# Patient Record
Sex: MALE | Race: WHITE | NOT HISPANIC OR LATINO
[De-identification: names, ages, dates, MRNs, and addresses within clinical notes are randomized per-mention and may not be internally consistent; named-entity substitution may affect disease eponyms.]

---

## 2017-10-18 ENCOUNTER — APPOINTMENT (OUTPATIENT)
Dept: ORTHOPEDIC SURGERY | Facility: CLINIC | Age: 82
End: 2017-10-18
Payer: MEDICARE

## 2017-10-18 VITALS
SYSTOLIC BLOOD PRESSURE: 116 MMHG | HEIGHT: 68 IN | DIASTOLIC BLOOD PRESSURE: 69 MMHG | WEIGHT: 208 LBS | BODY MASS INDEX: 31.52 KG/M2 | HEART RATE: 76 BPM

## 2017-10-18 PROCEDURE — 73562 X-RAY EXAM OF KNEE 3: CPT | Mod: 50

## 2017-10-18 PROCEDURE — 20610 DRAIN/INJ JOINT/BURSA W/O US: CPT | Mod: 50

## 2017-10-18 PROCEDURE — 99204 OFFICE O/P NEW MOD 45 MIN: CPT | Mod: 25

## 2017-10-18 RX ORDER — CARBIDOPA AND LEVODOPA 25; 250 MG/1; MG/1
TABLET ORAL
Refills: 0 | Status: ACTIVE | COMMUNITY

## 2017-10-18 RX ORDER — DULOXETINE HYDROCHLORIDE 30 MG/1
30 CAPSULE, DELAYED RELEASE PELLETS ORAL
Refills: 0 | Status: ACTIVE | COMMUNITY

## 2017-10-18 RX ORDER — ROPINIROLE 5 MG/1
TABLET, FILM COATED ORAL
Refills: 0 | Status: ACTIVE | COMMUNITY

## 2017-10-18 RX ORDER — METOPROLOL TARTRATE 75 MG/1
TABLET, FILM COATED ORAL
Refills: 0 | Status: ACTIVE | COMMUNITY

## 2017-12-18 ENCOUNTER — APPOINTMENT (OUTPATIENT)
Dept: ORTHOPEDIC SURGERY | Facility: CLINIC | Age: 82
End: 2017-12-18
Payer: MEDICARE

## 2017-12-18 VITALS
BODY MASS INDEX: 31.52 KG/M2 | SYSTOLIC BLOOD PRESSURE: 132 MMHG | WEIGHT: 208 LBS | DIASTOLIC BLOOD PRESSURE: 80 MMHG | HEIGHT: 68 IN | HEART RATE: 63 BPM

## 2017-12-18 DIAGNOSIS — M17.11 UNILATERAL PRIMARY OSTEOARTHRITIS, RIGHT KNEE: ICD-10-CM

## 2017-12-18 DIAGNOSIS — G89.29 LUMBAGO WITH SCIATICA, RIGHT SIDE: ICD-10-CM

## 2017-12-18 DIAGNOSIS — M54.41 LUMBAGO WITH SCIATICA, RIGHT SIDE: ICD-10-CM

## 2017-12-18 DIAGNOSIS — M17.12 UNILATERAL PRIMARY OSTEOARTHRITIS, LEFT KNEE: ICD-10-CM

## 2017-12-18 PROCEDURE — 99213 OFFICE O/P EST LOW 20 MIN: CPT

## 2017-12-28 ENCOUNTER — APPOINTMENT (OUTPATIENT)
Dept: PHYSICAL MEDICINE AND REHAB | Facility: CLINIC | Age: 82
End: 2017-12-28
Payer: MEDICARE

## 2017-12-28 VITALS
SYSTOLIC BLOOD PRESSURE: 145 MMHG | HEIGHT: 68 IN | BODY MASS INDEX: 31.37 KG/M2 | WEIGHT: 207 LBS | HEART RATE: 66 BPM | DIASTOLIC BLOOD PRESSURE: 85 MMHG

## 2017-12-28 DIAGNOSIS — M19.90 UNSPECIFIED OSTEOARTHRITIS, UNSPECIFIED SITE: ICD-10-CM

## 2017-12-28 PROCEDURE — 99205 OFFICE O/P NEW HI 60 MIN: CPT

## 2017-12-28 RX ORDER — DULOXETINE HYDROCHLORIDE 30 MG/1
30 CAPSULE, DELAYED RELEASE PELLETS ORAL
Qty: 15 | Refills: 0 | Status: ACTIVE | COMMUNITY
Start: 2017-12-28 | End: 1900-01-01

## 2017-12-28 RX ORDER — PREDNISONE 50 MG/1
TABLET ORAL
Refills: 0 | Status: DISCONTINUED | COMMUNITY
End: 2017-12-28

## 2017-12-28 RX ORDER — ACETAMINOPHEN AND CODEINE 300; 30 MG/1; MG/1
300-30 TABLET ORAL
Qty: 60 | Refills: 0 | Status: ACTIVE | COMMUNITY
Start: 2017-12-28 | End: 1900-01-01

## 2018-01-02 ENCOUNTER — RX RENEWAL (OUTPATIENT)
Age: 83
End: 2018-01-02

## 2018-01-08 ENCOUNTER — RX RENEWAL (OUTPATIENT)
Age: 83
End: 2018-01-08

## 2018-01-08 DIAGNOSIS — R06.9 UNSPECIFIED ABNORMALITIES OF BREATHING: ICD-10-CM

## 2018-01-12 ENCOUNTER — APPOINTMENT (OUTPATIENT)
Dept: PHYSICAL MEDICINE AND REHAB | Facility: CLINIC | Age: 83
End: 2018-01-12
Payer: MEDICARE

## 2018-01-12 ENCOUNTER — OUTPATIENT (OUTPATIENT)
Dept: OUTPATIENT SERVICES | Facility: HOSPITAL | Age: 83
LOS: 1 days | End: 2018-01-12
Payer: COMMERCIAL

## 2018-01-12 DIAGNOSIS — M48.062 SPINAL STENOSIS, LUMBAR REGION WITH NEUROGENIC CLAUDICATION: ICD-10-CM

## 2018-01-12 PROCEDURE — 62323 NJX INTERLAMINAR LMBR/SAC: CPT

## 2018-01-12 PROCEDURE — 77003 FLUOROGUIDE FOR SPINE INJECT: CPT

## 2018-01-16 ENCOUNTER — TRANSCRIPTION ENCOUNTER (OUTPATIENT)
Age: 83
End: 2018-01-16

## 2018-01-17 ENCOUNTER — RX RENEWAL (OUTPATIENT)
Age: 83
End: 2018-01-17

## 2018-01-23 ENCOUNTER — APPOINTMENT (OUTPATIENT)
Dept: ORTHOPEDIC SURGERY | Facility: HOSPITAL | Age: 83
End: 2018-01-23

## 2018-02-02 ENCOUNTER — APPOINTMENT (OUTPATIENT)
Dept: PHYSICAL MEDICINE AND REHAB | Facility: CLINIC | Age: 83
End: 2018-02-02
Payer: MEDICARE

## 2018-02-02 DIAGNOSIS — M76.31 ILIOTIBIAL BAND SYNDROME, RIGHT LEG: ICD-10-CM

## 2018-02-02 PROCEDURE — 99214 OFFICE O/P EST MOD 30 MIN: CPT

## 2018-02-07 VITALS — HEIGHT: 68 IN | HEART RATE: 66 BPM | WEIGHT: 207 LBS | RESPIRATION RATE: 14 BRPM | BODY MASS INDEX: 31.37 KG/M2

## 2018-02-09 ENCOUNTER — OUTPATIENT (OUTPATIENT)
Dept: OUTPATIENT SERVICES | Facility: HOSPITAL | Age: 83
LOS: 1 days | End: 2018-02-09
Payer: MEDICARE

## 2018-02-09 DIAGNOSIS — M25.551 PAIN IN RIGHT HIP: ICD-10-CM

## 2018-02-09 DIAGNOSIS — Z51.89 ENCOUNTER FOR OTHER SPECIFIED AFTERCARE: ICD-10-CM

## 2018-02-14 ENCOUNTER — APPOINTMENT (OUTPATIENT)
Dept: ORTHOPEDIC SURGERY | Facility: CLINIC | Age: 83
End: 2018-02-14

## 2018-02-16 PROCEDURE — G8978: CPT | Mod: CL

## 2018-02-16 PROCEDURE — 97163 PT EVAL HIGH COMPLEX 45 MIN: CPT

## 2018-02-16 PROCEDURE — G8979: CPT | Mod: CK

## 2018-02-16 PROCEDURE — 97010 HOT OR COLD PACKS THERAPY: CPT

## 2018-02-16 PROCEDURE — 97110 THERAPEUTIC EXERCISES: CPT

## 2018-03-20 ENCOUNTER — APPOINTMENT (OUTPATIENT)
Dept: ORTHOPEDIC SURGERY | Facility: CLINIC | Age: 83
End: 2018-03-20

## 2018-04-25 ENCOUNTER — APPOINTMENT (OUTPATIENT)
Dept: ORTHOPEDIC SURGERY | Facility: CLINIC | Age: 83
End: 2018-04-25

## 2018-05-07 ENCOUNTER — APPOINTMENT (OUTPATIENT)
Dept: ORTHOPEDIC SURGERY | Facility: CLINIC | Age: 83
End: 2018-05-07

## 2018-05-11 ENCOUNTER — APPOINTMENT (OUTPATIENT)
Dept: PHYSICAL MEDICINE AND REHAB | Facility: CLINIC | Age: 83
End: 2018-05-11
Payer: MEDICARE

## 2018-05-11 VITALS
HEIGHT: 68 IN | DIASTOLIC BLOOD PRESSURE: 81 MMHG | SYSTOLIC BLOOD PRESSURE: 135 MMHG | BODY MASS INDEX: 31.37 KG/M2 | WEIGHT: 207 LBS

## 2018-05-11 DIAGNOSIS — M48.062 SPINAL STENOSIS, LUMBAR REGION WITH NEUROGENIC CLAUDICATION: ICD-10-CM

## 2018-05-11 PROCEDURE — 99214 OFFICE O/P EST MOD 30 MIN: CPT

## 2018-05-14 PROBLEM — M48.062 LUMBAR STENOSIS WITH NEUROGENIC CLAUDICATION: Status: ACTIVE | Noted: 2018-01-01

## 2018-06-07 ENCOUNTER — TRANSCRIPTION ENCOUNTER (OUTPATIENT)
Age: 83
End: 2018-06-07

## 2018-06-08 ENCOUNTER — OUTPATIENT (OUTPATIENT)
Dept: OUTPATIENT SERVICES | Facility: HOSPITAL | Age: 83
LOS: 1 days | End: 2018-06-08
Payer: MEDICARE

## 2018-06-08 ENCOUNTER — APPOINTMENT (OUTPATIENT)
Dept: PHYSICAL MEDICINE AND REHAB | Facility: CLINIC | Age: 83
End: 2018-06-08
Payer: MEDICARE

## 2018-06-08 DIAGNOSIS — M54.16 RADICULOPATHY, LUMBAR REGION: ICD-10-CM

## 2018-06-08 PROCEDURE — 77003 FLUOROGUIDE FOR SPINE INJECT: CPT

## 2018-06-08 PROCEDURE — 62323 NJX INTERLAMINAR LMBR/SAC: CPT

## 2018-07-19 ENCOUNTER — APPOINTMENT (OUTPATIENT)
Dept: PHYSICAL MEDICINE AND REHAB | Facility: CLINIC | Age: 83
End: 2018-07-19

## 2018-09-14 ENCOUNTER — OTHER (OUTPATIENT)
Age: 83
End: 2018-09-14

## 2018-11-12 ENCOUNTER — OUTPATIENT (OUTPATIENT)
Dept: OUTPATIENT SERVICES | Facility: HOSPITAL | Age: 83
LOS: 1 days | End: 2018-11-12
Payer: MEDICARE

## 2018-11-12 DIAGNOSIS — Z51.89 ENCOUNTER FOR OTHER SPECIFIED AFTERCARE: ICD-10-CM

## 2018-11-20 DIAGNOSIS — R29.6 REPEATED FALLS: ICD-10-CM

## 2018-11-20 DIAGNOSIS — R27.9 UNSPECIFIED LACK OF COORDINATION: ICD-10-CM

## 2018-12-19 PROCEDURE — 97110 THERAPEUTIC EXERCISES: CPT

## 2018-12-19 PROCEDURE — 97112 NEUROMUSCULAR REEDUCATION: CPT

## 2018-12-19 PROCEDURE — 97163 PT EVAL HIGH COMPLEX 45 MIN: CPT

## 2019-06-14 ENCOUNTER — EMERGENCY (EMERGENCY)
Facility: HOSPITAL | Age: 84
LOS: 1 days | Discharge: HOME | End: 2019-06-14
Attending: EMERGENCY MEDICINE
Payer: MEDICARE

## 2019-06-14 VITALS
RESPIRATION RATE: 20 BRPM | OXYGEN SATURATION: 99 % | SYSTOLIC BLOOD PRESSURE: 158 MMHG | HEART RATE: 81 BPM | TEMPERATURE: 98 F | WEIGHT: 190.04 LBS | HEIGHT: 68 IN | DIASTOLIC BLOOD PRESSURE: 86 MMHG

## 2019-06-14 LAB
ALBUMIN SERPL ELPH-MCNC: 4.3 G/DL — SIGNIFICANT CHANGE UP (ref 3.3–5.2)
ALP SERPL-CCNC: 73 U/L — SIGNIFICANT CHANGE UP (ref 40–120)
ALT FLD-CCNC: 14 U/L — SIGNIFICANT CHANGE UP
ANION GAP SERPL CALC-SCNC: 13 MMOL/L — SIGNIFICANT CHANGE UP (ref 5–17)
APTT BLD: 26.7 SEC — LOW (ref 27.5–36.3)
AST SERPL-CCNC: 22 U/L — SIGNIFICANT CHANGE UP
BASOPHILS # BLD AUTO: 0 K/UL — SIGNIFICANT CHANGE UP (ref 0–0.2)
BASOPHILS NFR BLD AUTO: 0.1 % — SIGNIFICANT CHANGE UP (ref 0–2)
BILIRUB SERPL-MCNC: 0.7 MG/DL — SIGNIFICANT CHANGE UP (ref 0.4–2)
BUN SERPL-MCNC: 30 MG/DL — HIGH (ref 8–20)
CALCIUM SERPL-MCNC: 9.3 MG/DL — SIGNIFICANT CHANGE UP (ref 8.6–10.2)
CHLORIDE SERPL-SCNC: 98 MMOL/L — SIGNIFICANT CHANGE UP (ref 98–107)
CO2 SERPL-SCNC: 29 MMOL/L — SIGNIFICANT CHANGE UP (ref 22–29)
CREAT SERPL-MCNC: 1.2 MG/DL — SIGNIFICANT CHANGE UP (ref 0.5–1.3)
EOSINOPHIL # BLD AUTO: 0 K/UL — SIGNIFICANT CHANGE UP (ref 0–0.5)
EOSINOPHIL NFR BLD AUTO: 0.3 % — SIGNIFICANT CHANGE UP (ref 0–6)
GLUCOSE SERPL-MCNC: 111 MG/DL — SIGNIFICANT CHANGE UP (ref 70–115)
HCT VFR BLD CALC: 41.8 % — LOW (ref 42–52)
HGB BLD-MCNC: 13.6 G/DL — LOW (ref 14–18)
INR BLD: 0.93 RATIO — SIGNIFICANT CHANGE UP (ref 0.88–1.16)
LACTATE BLDV-MCNC: 3 MMOL/L — HIGH (ref 0.5–2)
LYMPHOCYTES # BLD AUTO: 15.5 % — LOW (ref 20–55)
LYMPHOCYTES # BLD AUTO: 2 K/UL — SIGNIFICANT CHANGE UP (ref 1–4.8)
MCHC RBC-ENTMCNC: 30.4 PG — SIGNIFICANT CHANGE UP (ref 27–31)
MCHC RBC-ENTMCNC: 32.5 G/DL — SIGNIFICANT CHANGE UP (ref 32–36)
MCV RBC AUTO: 93.5 FL — SIGNIFICANT CHANGE UP (ref 80–94)
MONOCYTES # BLD AUTO: 0.8 K/UL — SIGNIFICANT CHANGE UP (ref 0–0.8)
MONOCYTES NFR BLD AUTO: 6.2 % — SIGNIFICANT CHANGE UP (ref 3–10)
NEUTROPHILS # BLD AUTO: 10.2 K/UL — HIGH (ref 1.8–8)
NEUTROPHILS NFR BLD AUTO: 77.2 % — HIGH (ref 37–73)
PLATELET # BLD AUTO: 287 K/UL — SIGNIFICANT CHANGE UP (ref 150–400)
POTASSIUM SERPL-MCNC: 4.4 MMOL/L — SIGNIFICANT CHANGE UP (ref 3.5–5.3)
POTASSIUM SERPL-SCNC: 4.4 MMOL/L — SIGNIFICANT CHANGE UP (ref 3.5–5.3)
PROT SERPL-MCNC: 6.8 G/DL — SIGNIFICANT CHANGE UP (ref 6.6–8.7)
PROTHROM AB SERPL-ACNC: 10.7 SEC — SIGNIFICANT CHANGE UP (ref 10–12.9)
RBC # BLD: 4.47 M/UL — LOW (ref 4.6–6.2)
RBC # FLD: 14.4 % — SIGNIFICANT CHANGE UP (ref 11–15.6)
SODIUM SERPL-SCNC: 140 MMOL/L — SIGNIFICANT CHANGE UP (ref 135–145)
WBC # BLD: 13.2 K/UL — HIGH (ref 4.8–10.8)
WBC # FLD AUTO: 13.2 K/UL — HIGH (ref 4.8–10.8)

## 2019-06-14 PROCEDURE — 99284 EMERGENCY DEPT VISIT MOD MDM: CPT

## 2019-06-14 PROCEDURE — 72125 CT NECK SPINE W/O DYE: CPT

## 2019-06-14 PROCEDURE — 74177 CT ABD & PELVIS W/CONTRAST: CPT | Mod: 26

## 2019-06-14 PROCEDURE — 70450 CT HEAD/BRAIN W/O DYE: CPT | Mod: 26

## 2019-06-14 PROCEDURE — 85027 COMPLETE CBC AUTOMATED: CPT

## 2019-06-14 PROCEDURE — 72125 CT NECK SPINE W/O DYE: CPT | Mod: 26

## 2019-06-14 PROCEDURE — 73590 X-RAY EXAM OF LOWER LEG: CPT

## 2019-06-14 PROCEDURE — 73590 X-RAY EXAM OF LOWER LEG: CPT | Mod: 26,50

## 2019-06-14 PROCEDURE — 70450 CT HEAD/BRAIN W/O DYE: CPT

## 2019-06-14 PROCEDURE — 85730 THROMBOPLASTIN TIME PARTIAL: CPT

## 2019-06-14 PROCEDURE — 71260 CT THORAX DX C+: CPT

## 2019-06-14 PROCEDURE — 80053 COMPREHEN METABOLIC PANEL: CPT

## 2019-06-14 PROCEDURE — 71260 CT THORAX DX C+: CPT | Mod: 26

## 2019-06-14 PROCEDURE — 83605 ASSAY OF LACTIC ACID: CPT

## 2019-06-14 PROCEDURE — 90715 TDAP VACCINE 7 YRS/> IM: CPT

## 2019-06-14 PROCEDURE — 36415 COLL VENOUS BLD VENIPUNCTURE: CPT

## 2019-06-14 PROCEDURE — 99284 EMERGENCY DEPT VISIT MOD MDM: CPT | Mod: 25

## 2019-06-14 PROCEDURE — 74177 CT ABD & PELVIS W/CONTRAST: CPT

## 2019-06-14 PROCEDURE — 85610 PROTHROMBIN TIME: CPT

## 2019-06-14 RX ORDER — SODIUM CHLORIDE 9 MG/ML
1000 INJECTION INTRAMUSCULAR; INTRAVENOUS; SUBCUTANEOUS ONCE
Refills: 0 | Status: COMPLETED | OUTPATIENT
Start: 2019-06-14 | End: 2019-06-14

## 2019-06-14 RX ORDER — TETANUS TOXOID, REDUCED DIPHTHERIA TOXOID AND ACELLULAR PERTUSSIS VACCINE, ADSORBED 5; 2.5; 8; 8; 2.5 [IU]/.5ML; [IU]/.5ML; UG/.5ML; UG/.5ML; UG/.5ML
0.5 SUSPENSION INTRAMUSCULAR ONCE
Refills: 0 | Status: COMPLETED | OUTPATIENT
Start: 2019-06-14 | End: 2019-06-14

## 2019-06-14 RX ADMIN — SODIUM CHLORIDE 1000 MILLILITER(S): 9 INJECTION INTRAMUSCULAR; INTRAVENOUS; SUBCUTANEOUS at 19:15

## 2019-06-14 RX ADMIN — TETANUS TOXOID, REDUCED DIPHTHERIA TOXOID AND ACELLULAR PERTUSSIS VACCINE, ADSORBED 0.5 MILLILITER(S): 5; 2.5; 8; 8; 2.5 SUSPENSION INTRAMUSCULAR at 17:43

## 2019-06-14 NOTE — ED ADULT NURSE NOTE - INTEGUMENTARY WDL
Color consistent with ethnicity/race, warm, dry intact, resilient. with ecchymosis and wounds as charted above

## 2019-06-14 NOTE — ED ADULT NURSE REASSESSMENT NOTE - NS ED NURSE REASSESS COMMENT FT1
Report given and pt endorsed to Ismael Cunningham RN for follow up and continuity of care.
Pt reassessed.  Denies physical complaints.  Will continue to monitor.

## 2019-06-14 NOTE — ED PROVIDER NOTE - CARE PLAN
Principal Discharge DX:	Fall  Secondary Diagnosis:	Contusion  Secondary Diagnosis:	Ecchymosis Principal Discharge DX:	Fall  Secondary Diagnosis:	Contusion  Secondary Diagnosis:	Ecchymosis  Secondary Diagnosis:	Elevated lactic acid level

## 2019-06-14 NOTE — ED ADULT TRIAGE NOTE - CHIEF COMPLAINT QUOTE
was walking and fell hit head, 2 days ago and yesterday was picking up his dog and fell again takes asa 81 ng,, lac to left elbow, denies loc

## 2019-06-14 NOTE — ED ADULT NURSE NOTE - OBJECTIVE STATEMENT
84 year old male in no acute distress, alert and oriented x 4 c/o skin tear to left elbow and abrasion to right occipital, ecchymosis to right ribs and right hand s/p mechanical fall two days ago and again yesterday. Pt denies LOC, denies syncope, reports his knees are bad. Pt was told he needs bilateral knee replacements and states "but then my back went out". 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 Pt with hx of neuropathy, Parkinsons disease, has cane but daughter reports he does not use it. Fall precautions in place, wife at bedside. Pt reports "the nurse cleaned my elbow and put the dressing on it" and pt noted to have cdi to same. Will monitor.

## 2019-06-14 NOTE — ED STATDOCS - NS_ ATTENDINGSCRIBEDETAILS _ED_A_ED_FT
PT FELL NOT ON BLOOD THINNERS. ECCHYMOSIS ALL OVER JODY RUQ AND ABD WALL .  TO MAIN FOR EVAL OF MULTIPLE INJURIES AS/P FALL    The history, relevant review of systems, past medical and surgical history, medical decision making, and physical examination was documented by the scribe in my presence and I attest to the accuracy of the documentation.

## 2019-06-14 NOTE — ED PROVIDER NOTE - CLINICAL SUMMARY MEDICAL DECISION MAKING FREE TEXT BOX
85 yo M hx of parkinson and difficultuy ambulating p/w multiple fall. Will need CT head, ct cevical, ct chest/abdomen/pelvis. xray of lower extremities to r/o fracture and internal bleeding.

## 2019-06-14 NOTE — ED PROVIDER NOTE - PROGRESS NOTE DETAILS
Patient signed out to  pending repeat trop and CT scans. CT results as noted.  Repeat lactate as noted.  Pt denies any c/o and appears well and in NAD at this time.  Pt stable for d/c with family.  Instructed to use his walker and follow up PMD  Monday

## 2019-06-14 NOTE — ED PROVIDER NOTE - NS ED ROS FT
Review of Systems  •	CONSTITUTIONAL - no  fever, no diaphoresis, no weight change  •	SKIN - (+)  skin tare  •	HEMATOLOGIC - , (+)  bruising  •	EYES - no eye pain, no blurred vision  •	ENT - no change in hearing, no pain  •	RESPIRATORY - no shortness of breath, no cough  •	CARDIAC - no chest pain, no palpitations  •	GI - no abd pain, no nausea, no vomiting, no diarrhea, no constipation, no bleeding  •	GENITO-URINARY - no discharge, no dysuria; no hematuria,   •	ENDO - no polydypsia, no polyurea, no heat/no cold intolerance  •	MUSCULOSKELETAL - no joint pain, no swelling, no redness  •	NEUROLOGIC - no weakness, no headache, no anesthesia, no paresthesias  •	PSYCH - no anxiety, non suicidal, non homicidal, no hallucination, no depression

## 2019-06-14 NOTE — ED PROVIDER NOTE - OBJECTIVE STATEMENT
85 yo M hx of parkinson and HTN p/w multiple fall. He normally have trouble ambulating and need a walker, however, he doesn't use it. He report falling backwards yesterday and hitting his head. he report falling 2 days prior and hitting his arms. He fell last night. He used to go to physical therapy but no longer going due to difficulty getting in the car. 83 yo M hx of parkinson and HTN p/w multiple fall. He normally have trouble ambulating and need a walker, however, he doesn't use it. He report falling backwards yesterday and hitting his head. he report falling 2 days prior and hitting his arms. He fell last night. He used to go to physical therapy but no longer going due to difficulty getting in the car. No loc. No anticoagulation use.

## 2019-06-14 NOTE — ED ADULT NURSE NOTE - MUSCULOSKELETAL WDL
Full range of motion of upper and lower extremities, no joint tenderness/swelling. Pt noted to be stiff

## 2019-06-14 NOTE — ED ADULT NURSE NOTE - NSIMPLEMENTINTERV_GEN_ALL_ED
Implemented All Fall Risk Interventions:  [place] to call system. Call bell, personal items and telephone within reach. Instruct patient to call for assistance. Room bathroom lighting operational. Non-slip footwear when patient is off stretcher. Physically safe environment: no spills, clutter or unnecessary equipment. Stretcher in lowest position, wheels locked, appropriate side rails in place. Provide visual cue, wrist band, yellow gown, etc. Monitor gait and stability. Monitor for mental status changes and reorient to person, place, and time. Review medications for side effects contributing to fall risk. Reinforce activity limits and safety measures with patient and family.

## 2019-06-14 NOTE — ED PROVIDER NOTE - PHYSICAL EXAMINATION
VITAL SIGNS: I have reviewed nursing notes and confirm.  CONSTITUTIONAL: Well-developed; well-nourished; in no acute distress.  SKIN: Skin exam is warm and dry, no acute rash.  HEAD: Normocephalic; (+) small abrasion of left posterior head   EYES: PERRL, EOM intact; conjunctiva and sclera clear.  ENT: No nasal discharge; airway clear. Throat clear.  NECK: Supple; non tender.    CARD: S1, S2 normal; no murmurs, gallops, or rubs. Regular rate and rhythm. (+) chest wall tenderness   RESP: No wheezes,  no rales or rhonchi.   ABD:  soft; non-distended; (+) diffuse tenderness (+) ecchymosis of right flank and RUQ  EXT: Normal ROM (+) multiple skin abrasion of the b/l hand (+) 3 cm x 4 cm avulsion skin tare over the left elbow (+) tenderness to b/l knee with good ROM  NEURO: Alert, oriented. Grossly unremarkable. No focal deficits. no facial droop, moves all extremities,    PSYCH: Cooperative, appropriate.

## 2019-06-15 VITALS
OXYGEN SATURATION: 97 % | RESPIRATION RATE: 19 BRPM | SYSTOLIC BLOOD PRESSURE: 148 MMHG | TEMPERATURE: 98 F | DIASTOLIC BLOOD PRESSURE: 84 MMHG | HEART RATE: 76 BPM

## 2019-12-01 ENCOUNTER — INPATIENT (INPATIENT)
Facility: HOSPITAL | Age: 84
LOS: 3 days | Discharge: HOME | DRG: 57 | End: 2019-12-05
Attending: FAMILY MEDICINE | Admitting: HOSPITALIST
Payer: MEDICARE

## 2019-12-01 VITALS
RESPIRATION RATE: 18 BRPM | OXYGEN SATURATION: 98 % | TEMPERATURE: 98 F | DIASTOLIC BLOOD PRESSURE: 83 MMHG | HEIGHT: 68 IN | HEART RATE: 104 BPM | WEIGHT: 190.92 LBS | SYSTOLIC BLOOD PRESSURE: 140 MMHG

## 2019-12-01 DIAGNOSIS — R55 SYNCOPE AND COLLAPSE: ICD-10-CM

## 2019-12-01 PROBLEM — G20 PARKINSON'S DISEASE: Chronic | Status: ACTIVE | Noted: 2019-06-14

## 2019-12-01 PROBLEM — G62.9 POLYNEUROPATHY, UNSPECIFIED: Chronic | Status: ACTIVE | Noted: 2019-06-14

## 2019-12-01 LAB
ALBUMIN SERPL ELPH-MCNC: 3.9 G/DL — SIGNIFICANT CHANGE UP (ref 3.3–5.2)
ALP SERPL-CCNC: 67 U/L — SIGNIFICANT CHANGE UP (ref 40–120)
ALT FLD-CCNC: <5 U/L — SIGNIFICANT CHANGE UP
ANION GAP SERPL CALC-SCNC: 16 MMOL/L — SIGNIFICANT CHANGE UP (ref 5–17)
APTT BLD: 29.7 SEC — SIGNIFICANT CHANGE UP (ref 27.5–36.3)
AST SERPL-CCNC: 17 U/L — SIGNIFICANT CHANGE UP
BILIRUB SERPL-MCNC: 0.9 MG/DL — SIGNIFICANT CHANGE UP (ref 0.4–2)
BUN SERPL-MCNC: 27 MG/DL — HIGH (ref 8–20)
CALCIUM SERPL-MCNC: 8.8 MG/DL — SIGNIFICANT CHANGE UP (ref 8.6–10.2)
CHLORIDE SERPL-SCNC: 104 MMOL/L — SIGNIFICANT CHANGE UP (ref 98–107)
CO2 SERPL-SCNC: 23 MMOL/L — SIGNIFICANT CHANGE UP (ref 22–29)
CREAT SERPL-MCNC: 1.72 MG/DL — HIGH (ref 0.5–1.3)
GLUCOSE SERPL-MCNC: 178 MG/DL — HIGH (ref 70–115)
HCT VFR BLD CALC: 41.8 % — SIGNIFICANT CHANGE UP (ref 39–50)
HGB BLD-MCNC: 13.5 G/DL — SIGNIFICANT CHANGE UP (ref 13–17)
INR BLD: 1.01 RATIO — SIGNIFICANT CHANGE UP (ref 0.88–1.16)
MAGNESIUM SERPL-MCNC: 2.1 MG/DL — SIGNIFICANT CHANGE UP (ref 1.6–2.6)
MCHC RBC-ENTMCNC: 30.9 PG — SIGNIFICANT CHANGE UP (ref 27–34)
MCHC RBC-ENTMCNC: 32.3 GM/DL — SIGNIFICANT CHANGE UP (ref 32–36)
MCV RBC AUTO: 95.7 FL — SIGNIFICANT CHANGE UP (ref 80–100)
NT-PROBNP SERPL-SCNC: 312 PG/ML — HIGH (ref 0–300)
PLATELET # BLD AUTO: 278 K/UL — SIGNIFICANT CHANGE UP (ref 150–400)
POTASSIUM SERPL-MCNC: 4.8 MMOL/L — SIGNIFICANT CHANGE UP (ref 3.5–5.3)
POTASSIUM SERPL-SCNC: 4.8 MMOL/L — SIGNIFICANT CHANGE UP (ref 3.5–5.3)
PROT SERPL-MCNC: 6.3 G/DL — LOW (ref 6.6–8.7)
PROTHROM AB SERPL-ACNC: 11.6 SEC — SIGNIFICANT CHANGE UP (ref 10–12.9)
RBC # BLD: 4.37 M/UL — SIGNIFICANT CHANGE UP (ref 4.2–5.8)
RBC # FLD: 14 % — SIGNIFICANT CHANGE UP (ref 10.3–14.5)
SODIUM SERPL-SCNC: 143 MMOL/L — SIGNIFICANT CHANGE UP (ref 135–145)
TROPONIN T SERPL-MCNC: <0.01 NG/ML — SIGNIFICANT CHANGE UP (ref 0–0.06)
WBC # BLD: 9.97 K/UL — SIGNIFICANT CHANGE UP (ref 3.8–10.5)
WBC # FLD AUTO: 9.97 K/UL — SIGNIFICANT CHANGE UP (ref 3.8–10.5)

## 2019-12-01 PROCEDURE — 99285 EMERGENCY DEPT VISIT HI MDM: CPT

## 2019-12-01 PROCEDURE — 72148 MRI LUMBAR SPINE W/O DYE: CPT | Mod: 26

## 2019-12-01 PROCEDURE — 71275 CT ANGIOGRAPHY CHEST: CPT | Mod: 26

## 2019-12-01 PROCEDURE — 71045 X-RAY EXAM CHEST 1 VIEW: CPT | Mod: 26

## 2019-12-01 PROCEDURE — 99223 1ST HOSP IP/OBS HIGH 75: CPT

## 2019-12-01 PROCEDURE — 70450 CT HEAD/BRAIN W/O DYE: CPT | Mod: 26

## 2019-12-01 RX ORDER — DULOXETINE HYDROCHLORIDE 30 MG/1
30 CAPSULE, DELAYED RELEASE ORAL DAILY
Refills: 0 | Status: DISCONTINUED | OUTPATIENT
Start: 2019-12-01 | End: 2019-12-05

## 2019-12-01 RX ORDER — CEFTRIAXONE 500 MG/1
1000 INJECTION, POWDER, FOR SOLUTION INTRAMUSCULAR; INTRAVENOUS ONCE
Refills: 0 | Status: COMPLETED | OUTPATIENT
Start: 2019-12-01 | End: 2019-12-01

## 2019-12-01 RX ORDER — AZITHROMYCIN 500 MG/1
500 TABLET, FILM COATED ORAL EVERY 24 HOURS
Refills: 0 | Status: DISCONTINUED | OUTPATIENT
Start: 2019-12-02 | End: 2019-12-02

## 2019-12-01 RX ORDER — CARBIDOPA AND LEVODOPA 25; 100 MG/1; MG/1
1 TABLET ORAL THREE TIMES A DAY
Refills: 0 | Status: DISCONTINUED | OUTPATIENT
Start: 2019-12-01 | End: 2019-12-04

## 2019-12-01 RX ORDER — CARBIDOPA AND LEVODOPA 25; 100 MG/1; MG/1
1 TABLET ORAL ONCE
Refills: 0 | Status: COMPLETED | OUTPATIENT
Start: 2019-12-01 | End: 2019-12-01

## 2019-12-01 RX ORDER — ASPIRIN/CALCIUM CARB/MAGNESIUM 324 MG
324 TABLET ORAL ONCE
Refills: 0 | Status: COMPLETED | OUTPATIENT
Start: 2019-12-01 | End: 2019-12-01

## 2019-12-01 RX ORDER — HEPARIN SODIUM 5000 [USP'U]/ML
5000 INJECTION INTRAVENOUS; SUBCUTANEOUS EVERY 8 HOURS
Refills: 0 | Status: DISCONTINUED | OUTPATIENT
Start: 2019-12-01 | End: 2019-12-02

## 2019-12-01 RX ORDER — SODIUM CHLORIDE 9 MG/ML
1000 INJECTION INTRAMUSCULAR; INTRAVENOUS; SUBCUTANEOUS ONCE
Refills: 0 | Status: COMPLETED | OUTPATIENT
Start: 2019-12-01 | End: 2019-12-01

## 2019-12-01 RX ORDER — METOPROLOL TARTRATE 50 MG
25 TABLET ORAL DAILY
Refills: 0 | Status: DISCONTINUED | OUTPATIENT
Start: 2019-12-01 | End: 2019-12-05

## 2019-12-01 RX ORDER — ACETAMINOPHEN 500 MG
975 TABLET ORAL ONCE
Refills: 0 | Status: COMPLETED | OUTPATIENT
Start: 2019-12-01 | End: 2019-12-01

## 2019-12-01 RX ORDER — AZITHROMYCIN 500 MG/1
500 TABLET, FILM COATED ORAL ONCE
Refills: 0 | Status: COMPLETED | OUTPATIENT
Start: 2019-12-01 | End: 2019-12-01

## 2019-12-01 RX ORDER — CARBIDOPA AND LEVODOPA 25; 100 MG/1; MG/1
1.5 TABLET ORAL AT BEDTIME
Refills: 0 | Status: DISCONTINUED | OUTPATIENT
Start: 2019-12-01 | End: 2019-12-04

## 2019-12-01 RX ORDER — CARBIDOPA AND LEVODOPA 25; 100 MG/1; MG/1
1 TABLET ORAL ONCE
Refills: 0 | Status: DISCONTINUED | OUTPATIENT
Start: 2019-12-01 | End: 2019-12-01

## 2019-12-01 RX ORDER — AZITHROMYCIN 500 MG/1
TABLET, FILM COATED ORAL
Refills: 0 | Status: DISCONTINUED | OUTPATIENT
Start: 2019-12-01 | End: 2019-12-02

## 2019-12-01 RX ADMIN — CEFTRIAXONE 100 MILLIGRAM(S): 500 INJECTION, POWDER, FOR SOLUTION INTRAMUSCULAR; INTRAVENOUS at 20:25

## 2019-12-01 RX ADMIN — Medication 25 MILLIGRAM(S): at 20:41

## 2019-12-01 RX ADMIN — CARBIDOPA AND LEVODOPA 1 TABLET(S): 25; 100 TABLET ORAL at 20:42

## 2019-12-01 RX ADMIN — SODIUM CHLORIDE 1000 MILLILITER(S): 9 INJECTION INTRAMUSCULAR; INTRAVENOUS; SUBCUTANEOUS at 17:40

## 2019-12-01 RX ADMIN — Medication 10 MILLIGRAM(S): at 20:42

## 2019-12-01 RX ADMIN — Medication 975 MILLIGRAM(S): at 20:42

## 2019-12-01 RX ADMIN — Medication 975 MILLIGRAM(S): at 22:29

## 2019-12-01 RX ADMIN — DULOXETINE HYDROCHLORIDE 30 MILLIGRAM(S): 30 CAPSULE, DELAYED RELEASE ORAL at 20:42

## 2019-12-01 RX ADMIN — AZITHROMYCIN 255 MILLIGRAM(S): 500 TABLET, FILM COATED ORAL at 20:44

## 2019-12-01 RX ADMIN — Medication 324 MILLIGRAM(S): at 17:40

## 2019-12-01 RX ADMIN — SODIUM CHLORIDE 1000 MILLILITER(S): 9 INJECTION INTRAMUSCULAR; INTRAVENOUS; SUBCUTANEOUS at 22:29

## 2019-12-01 NOTE — ED PROVIDER NOTE - SECONDARY DIAGNOSIS.
Shortness of breath at rest Weakness of both lower extremities Tremor due to disorder of CNS Neuropathy Acute kidney injury Parkinsons disease

## 2019-12-01 NOTE — H&P ADULT - ASSESSMENT
83 y/o male with Parkinson's dx, HTN was brought to the ED s/p fall. Patient said he fell, did not remember what happened. As per ED note, family found him on the floor, difficult to wake him up; called his PCP who recommended to bring him to the ED for evaluation. Patient said he has not been feeling well for few weeks now just lost his wife. He has been falling more frequently. He reported worsening in stiffness of his LE which he attributed to Parkinson's dx. He has no fever, cough, nausea, vomiting, chest pain, shortness of breath, palpitation, abdominal pain, change in bowel/urinary habit, HA, blurry vision, weakness, slurred speech.     Med rec in AM please, called the son who said med list was given to ED; but I could not find it.     Syncope   Admit to monitor bed   Patient with hx of Parkinson dx likely 2/2 autonomic dysfunction. Might be due to dehydration too given ASIF. Was given IVF in the ED.   CT head: no acute intracranial finding   Will get TTE   ECG done and reviewed   Troponin negative   Consider cardio consult as needed   PT evaluation in AM     Parkinson dx   Sinemet 25-100mg (1.5 tab) 4 x a day     Neuropathy   On Gabapentin     Depression   Duloxetine 30mg     HTN   Metoprolol ER 25mg     Supportive   DVT prophylaxis: Heparin sc   Diet: pending dysphagia screening 83 y/o male with Parkinson's dx, HTN was brought to the ED s/p fall. Admitted for syncope.  CT head no acute finding, MRI Lumbar: no acute event. CTA chest done to rule out PE in the ED which was ruled out, however non-specific multifocal small ground glass nodules on the left is seen; diff diagnosis include infection vs neoplasm. Patient was given antibiotic in the ED; will watch off antibiotic for now given patient has no respiratory symptoms, WBC is WNL, no fever.        Med rec in AM please, called the son who said med list was given to ED; but I could not find it.     Syncope and collapse  Admit to monitor bed   Patient with hx of Parkinson dx likely 2/2 autonomic dysfunction. Might be due to dehydration too given ASIF. Was given IVF in the ED.   CT head: no acute intracranial finding   Will get TTE   ECG done and reviewed   Troponin negative   Consider cardio consult as needed   PT evaluation in AM     ASIF   Cr. 1.72 (baseline 1.2)   Likely due to dehydration   IVF given in the ED   Monitor renal function     Parkinson dx   Sinemet 25-100mg (1.5 tab) 4 x a day     Neuropathy   On Gabapentin, please confirm dose and restart in AM     Depression   Duloxetine 30mg     HTN   Metoprolol ER 25mg     Supportive   DVT prophylaxis: Heparin sc   Diet: pending dysphagia screening 83 y/o male with Parkinson's dx, HTN was brought to the ED s/p fall. Admitted for syncope.  CT head no acute finding, MRI Lumbar: no acute event. CTA chest done to rule out PE in the ED which was ruled out, however non-specific multifocal small ground glass nodules on the left is seen; diff diagnosis include infection vs neoplasm. Patient was given antibiotic in the ED; will watch off antibiotic for now given patient has no respiratory symptoms, WBC is WNL, no fever.        Med rec in AM please, called the son who said med list was given to ED; but I could not find it.     Syncope and collapse  Admit to monitor bed   Patient with hx of Parkinson dx likely 2/2 autonomic dysfunction. Might be due to dehydration too given ASIF. Was given IVF in the ED.   CT head: no acute intracranial finding   Will get TTE   ECG done and reviewed   Troponin negative   Consider cardio consult as needed   PT evaluation in AM     ASIF   Cr. 1.72 (baseline 1.2)   Likely due to dehydration   IVF given in the ED   Monitor renal function     Parkinson dx   Sinemet 25-100mg (1.5 tab) 4 x a day     Neuropathy   On Gabapentin, please confirm dose and restart in AM     Depression   Duloxetine 30mg     HTN   Metoprolol ER 25mg     Supportive   DVT prophylaxis: Heparin sc   Diet: DASH (passed dysphagia screening)

## 2019-12-01 NOTE — ED ADULT NURSE NOTE - OBJECTIVE STATEMENT
pt came from home, per son, when they came home from [facility] pt was found on the floor, unknown what had happen, pt has no memory of it.  c/o R hand and lower back pain. recently lost his wife on Nov 9, + increased confusion reported. + frequent dry coughing reported by son since the incident. 85yo male bib son after being found on the floor when he returned from [facility] this evening. unknown what had happen, pt has no memory of it.  c/o R hand and lower back pain.  + worsening tremors and unsteady gait having difficulty using the walker, recently lost his wife on Nov 9, + increased confusion . + frequent dry coughing reported by son since the incident. denies fever and chills.

## 2019-12-01 NOTE — ED PROVIDER NOTE - PROGRESS NOTE DETAILS
MR no cord compression, severe degenerative changes likely contributing to poor gait and back pain. CT head negative. CTA chest pending. ASIF likely dehydration component. pending CTA chest for PE TBA for syncope and progression of parkinsons with inability to ambulate -Slowey DO

## 2019-12-01 NOTE — ED PROVIDER NOTE - NS ED ROS FT
ROS: +CP/SOB. +cough. no fever. no n/v/d/c. no abd pain. no rash. no bleeding. +urinary complaints. +weakness. no vision changes. no HA. +back pain. no extremity swelling/deformity. +change in mental status.

## 2019-12-01 NOTE — ED PROVIDER NOTE - OBJECTIVE STATEMENT
85yo M with parkinsons and HTN, h/o frequent falls supposed to use walker but doesn't always, recent decline over last 2 weeks with death of wife, more frequent falls, worsening tremors/shakes arms/legs, falling mulitple times a day today unwitnessed fall, laying on floor, difficult for family to get him up, fed him but too weak to walk, called PCP who sent to ED. intermittent episodes of confusion. no slurred speech. no focal weakness. h/o neuropathy with numbness in legs but worsening, weakness worse in b/l LE, also with chronic back pain but worse, patient states 'yes and no' to most questions including worsening back pain after fall. multiple episodes of fecal/urinary incontinence last 2 days.no head injury today did hit head last week. admits to chronic CP unable to elaborate. per family SOB with productive cough. no fevers. not on A/C 83yo M with parkinsons and HTN, h/o frequent falls supposed to use walker but doesn't always, recent decline over last 2 weeks with death of wife, more frequent falls, worsening tremors/shakes arms/legs, falling mulitple times a day today unwitnessed fall, +LOC laying on floor, difficult for family to get him up, fed him but too weak to walk, called PCP who sent to ED. intermittent episodes of confusion. no slurred speech. no focal weakness. h/o neuropathy with numbness in legs but worsening, weakness worse in b/l LE, also with chronic back pain but worse, patient states 'yes and no' to most questions including worsening back pain after fall. multiple episodes of fecal/urinary incontinence last 2 days.no head injury today did hit head last week. admits to chronic CP unable to elaborate. per family SOB with productive cough. no fevers. not on A/C

## 2019-12-01 NOTE — ED ADULT NURSE NOTE - NSIMPLEMENTINTERV_GEN_ALL_ED
Implemented All Fall with Harm Risk Interventions:  [place] to call system. Call bell, personal items and telephone within reach. Instruct patient to call for assistance. Room bathroom lighting operational. Non-slip footwear when patient is off stretcher. Physically safe environment: no spills, clutter or unnecessary equipment. Stretcher in lowest position, wheels locked, appropriate side rails in place. Provide visual cue, wrist band, yellow gown, etc. Monitor gait and stability. Monitor for mental status changes and reorient to person, place, and time. Review medications for side effects contributing to fall risk. Reinforce activity limits and safety measures with patient and family. Provide visual clues: red socks.

## 2019-12-01 NOTE — H&P ADULT - HISTORY OF PRESENT ILLNESS
83 y/o male with Parkinson's dx, HTN was brought to the ED s/p fall. Patient said he fell, did not remember what happened. As per ED note, family found him on the floor, difficult to wake him up; called his PCP who recommended to bring him to the ED for evaluation. Patient said he has not been feeling well for few weeks now just lost his wife. He has been falling more frequently. He reported worsening in stiffness of his LE which he attributed to Parkinson's dx. He has no fever, cough, nausea, vomiting, chest pain, shortness of breath, palpitation, abdominal pain, change in bowel/urinary habit, HA, blurry vision, weakness, slurred speech.

## 2019-12-01 NOTE — ED PROVIDER NOTE - CARE PLAN
Principal Discharge DX:	Syncope and collapse  Secondary Diagnosis:	Shortness of breath at rest  Secondary Diagnosis:	Weakness of both lower extremities  Secondary Diagnosis:	Tremor due to disorder of CNS  Secondary Diagnosis:	Parkinsons disease  Secondary Diagnosis:	Neuropathy  Secondary Diagnosis:	Acute kidney injury

## 2019-12-01 NOTE — ED ADULT TRIAGE NOTE - CHIEF COMPLAINT QUOTE
Patient A&Ox4 complaining of problems with knees & difficulty walking. Stated on Thanksgiving. Son stated when came home, found patient on floor. Stated got dizzy & fell. Does not know if he lost consciousness, Son stated patient was passed out when they got to him.

## 2019-12-01 NOTE — ED PROVIDER NOTE - CLINICAL SUMMARY MEDICAL DECISION MAKING FREE TEXT BOX
patient very poor historian and family not able to contribute much more, seems like progressive decline over 2 weeks after death of wife, worsening weakness, numbness, confusion. frequent falls. patient has notable weakness and sensory deficits but unclear baseline, rectal tone present but weak- unclear whether related to understanding the command. tachypneic but lungs clear pulse ox 98% unable to elaborate on CP, EKG no TIFFANIE, will r/o ACS, no h/o CAD. CT head. TBA

## 2019-12-01 NOTE — ED PROVIDER NOTE - PHYSICAL EXAMINATION
Gen: mild distress, AOx3, resting tremor b/l UE  Head: NCAT  HEENT: PERRL, EOMI, oral mucosa moist, normal conjunctiva, neck supple  Lung: CTAB, mild tachypnea with belly breathing, speaking in full sentences  CV: rrr, no murmur, Normal perfusion  Abd: soft, NTND  Rectal: weak but present  MSK: No edema, no visible deformities, no midline spinal ttp, +ttp lumbar spine  Neuro: CN II-XII intact, 4/5 UE strength, 3/5 b/l LE strength, sensation intact pelvis up with decreased sensation from buttock down b/l LE, significant tremor all 4 extremities worse with movement  Skin: No rash   Psych: flat affect

## 2019-12-02 DIAGNOSIS — M06.9 RHEUMATOID ARTHRITIS, UNSPECIFIED: ICD-10-CM

## 2019-12-02 DIAGNOSIS — G62.9 POLYNEUROPATHY, UNSPECIFIED: ICD-10-CM

## 2019-12-02 DIAGNOSIS — M54.9 DORSALGIA, UNSPECIFIED: ICD-10-CM

## 2019-12-02 DIAGNOSIS — G20 PARKINSON'S DISEASE: ICD-10-CM

## 2019-12-02 DIAGNOSIS — R55 SYNCOPE AND COLLAPSE: ICD-10-CM

## 2019-12-02 DIAGNOSIS — N17.9 ACUTE KIDNEY FAILURE, UNSPECIFIED: ICD-10-CM

## 2019-12-02 LAB
ANION GAP SERPL CALC-SCNC: 16 MMOL/L — SIGNIFICANT CHANGE UP (ref 5–17)
BUN SERPL-MCNC: 27 MG/DL — HIGH (ref 8–20)
CALCIUM SERPL-MCNC: 8.6 MG/DL — SIGNIFICANT CHANGE UP (ref 8.6–10.2)
CHLORIDE SERPL-SCNC: 102 MMOL/L — SIGNIFICANT CHANGE UP (ref 98–107)
CO2 SERPL-SCNC: 24 MMOL/L — SIGNIFICANT CHANGE UP (ref 22–29)
CREAT SERPL-MCNC: 1.41 MG/DL — HIGH (ref 0.5–1.3)
GLUCOSE SERPL-MCNC: 102 MG/DL — SIGNIFICANT CHANGE UP (ref 70–115)
POTASSIUM SERPL-MCNC: 4.1 MMOL/L — SIGNIFICANT CHANGE UP (ref 3.5–5.3)
POTASSIUM SERPL-SCNC: 4.1 MMOL/L — SIGNIFICANT CHANGE UP (ref 3.5–5.3)
SODIUM SERPL-SCNC: 142 MMOL/L — SIGNIFICANT CHANGE UP (ref 135–145)

## 2019-12-02 PROCEDURE — 93306 TTE W/DOPPLER COMPLETE: CPT | Mod: 26

## 2019-12-02 RX ORDER — HYDRALAZINE HCL 50 MG
10 TABLET ORAL ONCE
Refills: 0 | Status: COMPLETED | OUTPATIENT
Start: 2019-12-02 | End: 2019-12-02

## 2019-12-02 RX ORDER — GABAPENTIN 400 MG/1
100 CAPSULE ORAL THREE TIMES A DAY
Refills: 0 | Status: DISCONTINUED | OUTPATIENT
Start: 2019-12-02 | End: 2019-12-03

## 2019-12-02 RX ORDER — FUROSEMIDE 40 MG
20 TABLET ORAL DAILY
Refills: 0 | Status: DISCONTINUED | OUTPATIENT
Start: 2019-12-02 | End: 2019-12-02

## 2019-12-02 RX ORDER — ENOXAPARIN SODIUM 100 MG/ML
30 INJECTION SUBCUTANEOUS DAILY
Refills: 0 | Status: DISCONTINUED | OUTPATIENT
Start: 2019-12-02 | End: 2019-12-05

## 2019-12-02 RX ORDER — TRAMADOL HYDROCHLORIDE 50 MG/1
50 TABLET ORAL EVERY 8 HOURS
Refills: 0 | Status: DISCONTINUED | OUTPATIENT
Start: 2019-12-02 | End: 2019-12-05

## 2019-12-02 RX ADMIN — DULOXETINE HYDROCHLORIDE 30 MILLIGRAM(S): 30 CAPSULE, DELAYED RELEASE ORAL at 12:39

## 2019-12-02 RX ADMIN — HEPARIN SODIUM 5000 UNIT(S): 5000 INJECTION INTRAVENOUS; SUBCUTANEOUS at 05:56

## 2019-12-02 RX ADMIN — CARBIDOPA AND LEVODOPA 1 TABLET(S): 25; 100 TABLET ORAL at 22:38

## 2019-12-02 RX ADMIN — GABAPENTIN 100 MILLIGRAM(S): 400 CAPSULE ORAL at 22:37

## 2019-12-02 RX ADMIN — CARBIDOPA AND LEVODOPA 1 TABLET(S): 25; 100 TABLET ORAL at 12:39

## 2019-12-02 RX ADMIN — HEPARIN SODIUM 5000 UNIT(S): 5000 INJECTION INTRAVENOUS; SUBCUTANEOUS at 12:39

## 2019-12-02 RX ADMIN — CARBIDOPA AND LEVODOPA 1 TABLET(S): 25; 100 TABLET ORAL at 05:56

## 2019-12-02 RX ADMIN — Medication 10 MILLIGRAM(S): at 05:56

## 2019-12-02 RX ADMIN — CARBIDOPA AND LEVODOPA 1.5 TABLET(S): 25; 100 TABLET ORAL at 22:38

## 2019-12-02 RX ADMIN — Medication 10 MILLIGRAM(S): at 16:23

## 2019-12-02 RX ADMIN — AZITHROMYCIN 255 MILLIGRAM(S): 500 TABLET, FILM COATED ORAL at 16:23

## 2019-12-02 RX ADMIN — Medication 10 MILLIGRAM(S): at 06:43

## 2019-12-02 RX ADMIN — GABAPENTIN 100 MILLIGRAM(S): 400 CAPSULE ORAL at 12:39

## 2019-12-02 NOTE — PROGRESS NOTE ADULT - PROBLEM SELECTOR PLAN 2
We will continue Sinemet at home dosing, no evidence of decompensation at this time therefore no neurology consult will be called.

## 2019-12-02 NOTE — PROGRESS NOTE ADULT - PROBLEM SELECTOR PLAN 1
Echocardiogram reviewed today appears normal and acceptable. Will ask for PT eval, and consider carotid Doppler tomorrow.

## 2019-12-02 NOTE — PROGRESS NOTE ADULT - SUBJECTIVE AND OBJECTIVE BOX
SUBJECTIVE    Patient feeling well this morning, tolerating diet.  No complaints at this time and slept well last night.    REVIEW OF SYSTEMS    General: Not in any pain	    Skin/Breast: No rash  	  ENMT: No visual problems, no sore throat	    Respiratory and Thorax: No cough, No CP, No SOB  	  Cardiovascular: No CP, No palpitations    Gastrointestinal: No Abd pain, No N/V/D    Musculoskeletal: No Joint pain, Occasional back pain	    Neurological: No headache    Psychiatric: No anxiety      OBJECTIVE    Vital Signs Last 24 Hrs  T(C): 36.7 (12-02-19 @ 18:53), Max: 36.8 (12-02-19 @ 05:38)  T(F): 98.1 (12-02-19 @ 18:53), Max: 98.3 (12-02-19 @ 05:38)  HR: 90 (12-02-19 @ 18:53) (69 - 90)  BP: 147/53 (12-02-19 @ 18:53) (128/66 - 171/92)  BP(mean): --  RR: 18 (12-02-19 @ 18:53) (18 - 18)  SpO2: 95% (12-02-19 @ 18:53) (95% - 98%)    PHYSICAL EXAM:    Constitutional: Not in any distress    Eyes: No conjunctival injection    ENMT: No oral lesions    Neck: No nodes, no adenopathy    Back: Straight, no defects    Respiratory: clear b/l    Cardiovascular: RRR, no murmur    Gastrointestinal: soft, NT, ND    Extremities: No edema, no erythema    Neurological: no focal deficit    Skin: No rash      MEDICATIONS  (STANDING):  carbidopa/levodopa  25/100 1.5 Tablet(s) Oral at bedtime  carbidopa/levodopa  25/100 1 Tablet(s) Oral three times a day  DULoxetine 30 milliGRAM(s) Oral daily  enoxaparin Injectable 30 milliGRAM(s) SubCutaneous daily  gabapentin 100 milliGRAM(s) Oral three times a day  metoprolol succinate ER 25 milliGRAM(s) Oral daily  predniSONE   Tablet 10 milliGRAM(s) Oral two times a day    MEDICATIONS  (PRN):  traMADol 50 milliGRAM(s) Oral every 8 hours PRN Severe Pain (7 - 10)    CARDIAC MARKERS ( 01 Dec 2019 15:07 )  x     / <0.01 ng/mL / x     / x     / x                                13.5   9.97  )-----------( 278      ( 01 Dec 2019 15:07 )             41.8     02 Dec 2019 06:44    142    |  102    |  27.0   ----------------------------<  102    4.1     |  24.0   |  1.41     Ca    8.6        02 Dec 2019 06:44  Mg     2.1       01 Dec 2019 15:07    TPro  6.3    /  Alb  3.9    /  TBili  0.9    /  DBili  x      /  AST  17     /  ALT  <5     /  AlkPhos  67     01 Dec 2019 15:07    Allergies    No Known Allergies    Intolerances

## 2019-12-03 ENCOUNTER — TRANSCRIPTION ENCOUNTER (OUTPATIENT)
Age: 84
End: 2019-12-03

## 2019-12-03 LAB
APPEARANCE UR: CLEAR — SIGNIFICANT CHANGE UP
BACTERIA # UR AUTO: ABNORMAL
BILIRUB UR-MCNC: NEGATIVE — SIGNIFICANT CHANGE UP
COLOR SPEC: YELLOW — SIGNIFICANT CHANGE UP
DIFF PNL FLD: ABNORMAL
EPI CELLS # UR: NEGATIVE — SIGNIFICANT CHANGE UP
GLUCOSE UR QL: NEGATIVE MG/DL — SIGNIFICANT CHANGE UP
KETONES UR-MCNC: NEGATIVE — SIGNIFICANT CHANGE UP
LEUKOCYTE ESTERASE UR-ACNC: NEGATIVE — SIGNIFICANT CHANGE UP
NITRITE UR-MCNC: NEGATIVE — SIGNIFICANT CHANGE UP
PH UR: 6 — SIGNIFICANT CHANGE UP (ref 5–8)
PROT UR-MCNC: NEGATIVE MG/DL — SIGNIFICANT CHANGE UP
RBC CASTS # UR COMP ASSIST: SIGNIFICANT CHANGE UP /HPF (ref 0–4)
SP GR SPEC: 1.01 — SIGNIFICANT CHANGE UP (ref 1.01–1.02)
UROBILINOGEN FLD QL: NEGATIVE MG/DL — SIGNIFICANT CHANGE UP
WBC UR QL: SIGNIFICANT CHANGE UP

## 2019-12-03 RX ORDER — GABAPENTIN 400 MG/1
1 CAPSULE ORAL
Qty: 0 | Refills: 0 | DISCHARGE
Start: 2019-12-03

## 2019-12-03 RX ORDER — CARBIDOPA AND LEVODOPA 25; 100 MG/1; MG/1
1.5 TABLET ORAL
Qty: 0 | Refills: 0 | DISCHARGE
Start: 2019-12-03

## 2019-12-03 RX ORDER — CARBIDOPA AND LEVODOPA 25; 100 MG/1; MG/1
1 TABLET ORAL
Qty: 0 | Refills: 0 | DISCHARGE
Start: 2019-12-03

## 2019-12-03 RX ORDER — METOPROLOL TARTRATE 50 MG
1 TABLET ORAL
Qty: 0 | Refills: 0 | DISCHARGE
Start: 2019-12-03

## 2019-12-03 RX ORDER — INFLUENZA VIRUS VACCINE 15; 15; 15; 15 UG/.5ML; UG/.5ML; UG/.5ML; UG/.5ML
0.5 SUSPENSION INTRAMUSCULAR ONCE
Refills: 0 | Status: DISCONTINUED | OUTPATIENT
Start: 2019-12-03 | End: 2019-12-05

## 2019-12-03 RX ORDER — DULOXETINE HYDROCHLORIDE 30 MG/1
1 CAPSULE, DELAYED RELEASE ORAL
Qty: 0 | Refills: 0 | DISCHARGE
Start: 2019-12-03

## 2019-12-03 RX ORDER — TRAMADOL HYDROCHLORIDE 50 MG/1
1 TABLET ORAL
Qty: 0 | Refills: 0 | DISCHARGE
Start: 2019-12-03

## 2019-12-03 RX ORDER — GABAPENTIN 400 MG/1
300 CAPSULE ORAL
Refills: 0 | Status: DISCONTINUED | OUTPATIENT
Start: 2019-12-03 | End: 2019-12-05

## 2019-12-03 RX ORDER — CARBIDOPA AND LEVODOPA 25; 100 MG/1; MG/1
0 TABLET ORAL
Qty: 0 | Refills: 0 | DISCHARGE

## 2019-12-03 RX ADMIN — GABAPENTIN 100 MILLIGRAM(S): 400 CAPSULE ORAL at 13:17

## 2019-12-03 RX ADMIN — ENOXAPARIN SODIUM 30 MILLIGRAM(S): 100 INJECTION SUBCUTANEOUS at 13:17

## 2019-12-03 RX ADMIN — Medication 25 MILLIGRAM(S): at 05:13

## 2019-12-03 RX ADMIN — DULOXETINE HYDROCHLORIDE 30 MILLIGRAM(S): 30 CAPSULE, DELAYED RELEASE ORAL at 13:17

## 2019-12-03 RX ADMIN — CARBIDOPA AND LEVODOPA 1 TABLET(S): 25; 100 TABLET ORAL at 21:17

## 2019-12-03 RX ADMIN — CARBIDOPA AND LEVODOPA 1 TABLET(S): 25; 100 TABLET ORAL at 05:13

## 2019-12-03 RX ADMIN — CARBIDOPA AND LEVODOPA 1.5 TABLET(S): 25; 100 TABLET ORAL at 21:18

## 2019-12-03 RX ADMIN — Medication 10 MILLIGRAM(S): at 17:57

## 2019-12-03 RX ADMIN — Medication 10 MILLIGRAM(S): at 05:13

## 2019-12-03 RX ADMIN — CARBIDOPA AND LEVODOPA 1 TABLET(S): 25; 100 TABLET ORAL at 13:17

## 2019-12-03 RX ADMIN — GABAPENTIN 100 MILLIGRAM(S): 400 CAPSULE ORAL at 05:13

## 2019-12-03 NOTE — PHYSICAL THERAPY INITIAL EVALUATION ADULT - LEVEL OF INDEPENDENCE: STAND/SIT, REHAB EVAL
unable to perform/attempted to perform, pt unable to assume upright standing position, increased overall body tremors noted during activity and pt reported increased c/o dizziness. deemed unsafe to perform at this time, will require further assessment

## 2019-12-03 NOTE — PROGRESS NOTE ADULT - PROBLEM SELECTOR PLAN 3
Patient has advanced disease that appears clinically stable.  Will likely need subacute rehab after discharge from hospital.  Spoke with patient's son and patient and  to start discharge planning.

## 2019-12-03 NOTE — PHYSICAL THERAPY INITIAL EVALUATION ADULT - IMPAIRMENTS FOUND, PT EVAL
aerobic capacity/endurance/gait, locomotion, and balance/muscle strength/posture/ROM/gross motor/poor safety awareness

## 2019-12-03 NOTE — DISCHARGE NOTE PROVIDER - HOSPITAL COURSE
83 y/o male with Parkinson's dx, HTN was brought to the ED s/p fall. Patient said he fell, did not remember what happened. As per ED note, family found him on the floor, difficult to wake him up; called his PCP who recommended to bring him to the ED for evaluation. Patient said he has not been feeling well for few weeks now just lost his wife. He has been falling more frequently. He reported worsening in stiffness of his LE which he attributed to Parkinson's dx. He has no fever, cough, nausea, vomiting, chest pain, shortness of breath, palpitation, abdominal pain, change in bowel/urinary habit, HA, blurry vision, weakness, slurred speech.         Patient was admitted for syncope.  CAT scan of the chest showed possible pneumonia and was given antibiotics by the emergency room and on admission by the admitting physician.  Patient has no fever no cough and no elevated white blood cell count so antibiotics was discontinued.  Patient had a EKG and echocardiogram which were both essentially normal.  Patient's hospitalization was uneventful he has a baseline creatinine about 1.5.  Patient had physical therapy and it was recommended that patient go to rehab prior to going home.  Patient was discharged to rehab with the only change in medication is increasing his Neurontin to 300 mg twice a day.

## 2019-12-03 NOTE — PHYSICAL THERAPY INITIAL EVALUATION ADULT - LEVEL OF INDEPENDENCE: SIT/STAND, REHAB EVAL
attempted to perform, pt unable to assume upright standing position, increased overall body tremors noted during activity and pt reported increased c/o dizziness. deemed unsafe to perform at this time, will require further assessment/unable to perform

## 2019-12-03 NOTE — PHYSICAL THERAPY INITIAL EVALUATION ADULT - MANUAL MUSCLE TESTING RESULTS, REHAB EVAL
bilateral UE's 3- to 3/5, bilateral LE's 2+/5 with apparent bilateral UE's tremor noted during testing and bilateral LE's tremor during gross assessment/grossly assessed due to

## 2019-12-03 NOTE — PHYSICAL THERAPY INITIAL EVALUATION ADULT - CRITERIA FOR SKILLED THERAPEUTIC INTERVENTIONS
risk reduction/prevention/functional limitations in following categories/anticipated equipment needs at discharge/predicted duration of therapy intervention/rehab potential/impairments found/therapy frequency/anticipated discharge recommendation

## 2019-12-03 NOTE — PHYSICAL THERAPY INITIAL EVALUATION ADULT - ADDITIONAL COMMENTS
Pt lives with daughter in 1 story house with no TIFFANIE, no stairs inside and bed & bath on ground level. Pt's PLOF was independent in ambulation with SAC indoors + RW for outdoors and independent in UE dressing + grooming tasks, however required supervision &/or assistance of another person for LE dressing ,toileting and bathing tasks. Pt has walk in shower with shower chair, SAC, RW and commode DME in home. At this time, bedside commode and RW recommended upon d/c

## 2019-12-03 NOTE — PHYSICAL THERAPY INITIAL EVALUATION ADULT - PHYSICAL ASSIST/NONPHYSICAL ASSIST: STAND/SIT, REHAB EVAL
verbal cues/1 person assist/performed with 1 person assist, however pt will benefit from a 2 person assist

## 2019-12-03 NOTE — DISCHARGE NOTE PROVIDER - CARE PROVIDER_API CALL
Ismael Hernandez)  Family Medicine  [address] [place] [address]  Phone: [phone]  Fax: [phone]  Follow Up Time:

## 2019-12-03 NOTE — PROGRESS NOTE ADULT - PROBLEM SELECTOR PLAN 1
Patient had unwitnessed. No evidence of cardiac or new neurologic finding to suggest syncope.  Patient for PT evaluation today and discharge planning.

## 2019-12-03 NOTE — DISCHARGE NOTE PROVIDER - NSDCCPCAREPLAN_GEN_ALL_CORE_FT
PRINCIPAL DISCHARGE DIAGNOSIS  Diagnosis: Syncope and collapse  Assessment and Plan of Treatment:       SECONDARY DISCHARGE DIAGNOSES  Diagnosis: Acute kidney injury  Assessment and Plan of Treatment:     Diagnosis: Neuropathy  Assessment and Plan of Treatment:     Diagnosis: Parkinsons disease  Assessment and Plan of Treatment:     Diagnosis: Tremor due to disorder of CNS  Assessment and Plan of Treatment:     Diagnosis: Weakness of both lower extremities  Assessment and Plan of Treatment:     Diagnosis: Shortness of breath at rest  Assessment and Plan of Treatment:

## 2019-12-03 NOTE — PHYSICAL THERAPY INITIAL EVALUATION ADULT - DIAGNOSIS, PT EVAL
Decreased functional status and mobility due to decrease strength, balance, transfers, bed mobility, endurance and ambulation tolerance

## 2019-12-03 NOTE — PHYSICAL THERAPY INITIAL EVALUATION ADULT - PHYSICAL ASSIST/NONPHYSICAL ASSIST: SIT/STAND, REHAB EVAL
verbal cues/1 person assist/performed 1 person assist, however pt will benefit from a 2 person assist

## 2019-12-03 NOTE — PHYSICAL THERAPY INITIAL EVALUATION ADULT - GROSSLY INTACT, SENSORY
Left UE/Right UE/Left LE/Right LE/Grossly Intact/however pt reports that bilateral LE's felt numb and bilateral UE's + bilateral LE's sensation was reported as feeling "very light"

## 2019-12-03 NOTE — PROGRESS NOTE ADULT - SUBJECTIVE AND OBJECTIVE BOX
SUBJECTIVE    Patient feeling well, tolerating diet.  Slept well.  Only complains of stiffness and numbness in feet.    REVIEW OF SYSTEMS    General: Not in any pain	    Skin/Breast: No rash  	  ENMT: No visual problems, no sore throat	    Respiratory and Thorax: No cough, No CP, No SOB  	  Cardiovascular: No CP, No palpitations    Gastrointestinal: No Abd pain, No N/V/D    Musculoskeletal: No Joint pain, No back pain	    Neurological: No headache    Psychiatric: No anxiety      OBJECTIVE    Vital Signs Last 24 Hrs  T(C): 36.6 (12-03-19 @ 16:14), Max: 36.9 (12-02-19 @ 21:15)  T(F): 97.8 (12-03-19 @ 16:14), Max: 98.4 (12-02-19 @ 21:15)  HR: 83 (12-03-19 @ 16:14) (64 - 84)  BP: 134/86 (12-03-19 @ 16:14) (134/86 - 162/86)  BP(mean): --  RR: 18 (12-03-19 @ 16:14) (18 - 91)  SpO2: 93% (12-03-19 @ 16:14) (92% - 96%)    PHYSICAL EXAM:    Constitutional: Not in any distress    Eyes: No conjunctival injection    ENMT: No oral lesions    Neck: No nodes, no adenopathy    Back: Straight, no defects    Respiratory: clear b/l    Cardiovascular: RRR, no murmur    Gastrointestinal: soft, NT, ND    Extremities: No edema, no erythema    Neurological: no focal deficit    Skin: No rash      MEDICATIONS  (STANDING):  carbidopa/levodopa  25/100 1.5 Tablet(s) Oral at bedtime  carbidopa/levodopa  25/100 1 Tablet(s) Oral three times a day  DULoxetine 30 milliGRAM(s) Oral daily  enoxaparin Injectable 30 milliGRAM(s) SubCutaneous daily  gabapentin 300 milliGRAM(s) Oral two times a day  influenza   Vaccine 0.5 milliLiter(s) IntraMuscular once  metoprolol succinate ER 25 milliGRAM(s) Oral daily  predniSONE   Tablet 10 milliGRAM(s) Oral two times a day    MEDICATIONS  (PRN):  traMADol 50 milliGRAM(s) Oral every 8 hours PRN Severe Pain (7 - 10)          02 Dec 2019 06:44    142    |  102    |  27.0   ----------------------------<  102    4.1     |  24.0   |  1.41     Ca    8.6        02 Dec 2019 06:44      Allergies    No Known Allergies    Intolerances

## 2019-12-03 NOTE — PHYSICAL THERAPY INITIAL EVALUATION ADULT - TRANSFER SAFETY CONCERNS NOTED: SIT/STAND, REHAB EVAL
decreased weight-shifting ability/decreased safety awareness/losing balance/will require further assessment/decreased step length

## 2019-12-04 RX ORDER — HYDRALAZINE HCL 50 MG
10 TABLET ORAL ONCE
Refills: 0 | Status: COMPLETED | OUTPATIENT
Start: 2019-12-04 | End: 2019-12-04

## 2019-12-04 RX ORDER — CARBIDOPA AND LEVODOPA 25; 100 MG/1; MG/1
1 TABLET ORAL
Refills: 0 | Status: DISCONTINUED | OUTPATIENT
Start: 2019-12-04 | End: 2019-12-05

## 2019-12-04 RX ADMIN — GABAPENTIN 300 MILLIGRAM(S): 400 CAPSULE ORAL at 17:44

## 2019-12-04 RX ADMIN — Medication 10 MILLIGRAM(S): at 07:07

## 2019-12-04 RX ADMIN — CARBIDOPA AND LEVODOPA 1 TABLET(S): 25; 100 TABLET ORAL at 05:38

## 2019-12-04 RX ADMIN — ENOXAPARIN SODIUM 30 MILLIGRAM(S): 100 INJECTION SUBCUTANEOUS at 12:34

## 2019-12-04 RX ADMIN — DULOXETINE HYDROCHLORIDE 30 MILLIGRAM(S): 30 CAPSULE, DELAYED RELEASE ORAL at 12:34

## 2019-12-04 RX ADMIN — Medication 10 MILLIGRAM(S): at 17:44

## 2019-12-04 RX ADMIN — GABAPENTIN 300 MILLIGRAM(S): 400 CAPSULE ORAL at 05:38

## 2019-12-04 RX ADMIN — Medication 25 MILLIGRAM(S): at 05:38

## 2019-12-04 RX ADMIN — Medication 10 MILLIGRAM(S): at 05:38

## 2019-12-04 RX ADMIN — CARBIDOPA AND LEVODOPA 1 TABLET(S): 25; 100 TABLET ORAL at 12:34

## 2019-12-04 NOTE — PROGRESS NOTE ADULT - PROBLEM SELECTOR PLAN 3
Patient's family states the patient was taking Sinemet 1-1/2 Rosalino's 4 times a day.  Will change med list to reflect his home medications.

## 2019-12-04 NOTE — PROGRESS NOTE ADULT - SUBJECTIVE AND OBJECTIVE BOX
SUBJECTIVE    Patient feeling well today, no complaints.  Tolerating diet chest very stiff and not sure he can walk.    REVIEW OF SYSTEMS    General: Not in any pain	    Skin/Breast: No rash  	  ENMT: No visual problems, no sore throat	    Respiratory and Thorax: No cough, No CP, No SOB  	  Cardiovascular: No CP, No palpitations    Gastrointestinal: No Abd pain, No N/V/D    Musculoskeletal: No Joint pain, No back pain	    Neurological: No headache    Psychiatric: No anxiety      OBJECTIVE    Vital Signs Last 24 Hrs  T(C): 36.7 (12-04-19 @ 16:34), Max: 36.9 (12-04-19 @ 05:35)  T(F): 98.1 (12-04-19 @ 16:34), Max: 98.5 (12-04-19 @ 05:35)  HR: 80 (12-04-19 @ 16:34) (80 - 93)  BP: 128/83 (12-04-19 @ 16:34) (128/83 - 172/100)  BP(mean): --  RR: 18 (12-04-19 @ 16:34) (18 - 18)  SpO2: 94% (12-04-19 @ 16:34) (93% - 95%)    PHYSICAL EXAM:    Constitutional: Not in any distress    Eyes: No conjunctival injection    ENMT: No oral lesions    Neck: No nodes, no adenopathy    Back: Straight, no defects    Respiratory: clear b/l    Cardiovascular: RRR, no murmur    Gastrointestinal: soft, NT, ND    Extremities: No edema, no erythema    Neurological: no focal deficit    Skin: No rash      MEDICATIONS  (STANDING):  carbidopa/levodopa  25/100 1.5 Tablet(s) Oral at bedtime  carbidopa/levodopa  25/100 1 Tablet(s) Oral three times a day  DULoxetine 30 milliGRAM(s) Oral daily  enoxaparin Injectable 30 milliGRAM(s) SubCutaneous daily  gabapentin 300 milliGRAM(s) Oral two times a day  influenza   Vaccine 0.5 milliLiter(s) IntraMuscular once  metoprolol succinate ER 25 milliGRAM(s) Oral daily  predniSONE   Tablet 10 milliGRAM(s) Oral two times a day    MEDICATIONS  (PRN):  traMADol 50 milliGRAM(s) Oral every 8 hours PRN Severe Pain (7 - 10)                Allergies    No Known Allergies    Intolerances

## 2019-12-05 ENCOUNTER — TRANSCRIPTION ENCOUNTER (OUTPATIENT)
Age: 84
End: 2019-12-05

## 2019-12-05 VITALS
OXYGEN SATURATION: 93 % | RESPIRATION RATE: 18 BRPM | TEMPERATURE: 98 F | SYSTOLIC BLOOD PRESSURE: 143 MMHG | DIASTOLIC BLOOD PRESSURE: 89 MMHG | HEART RATE: 89 BPM

## 2019-12-05 PROCEDURE — 97116 GAIT TRAINING THERAPY: CPT

## 2019-12-05 PROCEDURE — 81001 URINALYSIS AUTO W/SCOPE: CPT

## 2019-12-05 PROCEDURE — 36415 COLL VENOUS BLD VENIPUNCTURE: CPT

## 2019-12-05 PROCEDURE — 70450 CT HEAD/BRAIN W/O DYE: CPT

## 2019-12-05 PROCEDURE — 80048 BASIC METABOLIC PNL TOTAL CA: CPT

## 2019-12-05 PROCEDURE — 93306 TTE W/DOPPLER COMPLETE: CPT

## 2019-12-05 PROCEDURE — 80053 COMPREHEN METABOLIC PANEL: CPT

## 2019-12-05 PROCEDURE — 85610 PROTHROMBIN TIME: CPT

## 2019-12-05 PROCEDURE — 71045 X-RAY EXAM CHEST 1 VIEW: CPT

## 2019-12-05 PROCEDURE — 71275 CT ANGIOGRAPHY CHEST: CPT

## 2019-12-05 PROCEDURE — 83880 ASSAY OF NATRIURETIC PEPTIDE: CPT

## 2019-12-05 PROCEDURE — 84484 ASSAY OF TROPONIN QUANT: CPT

## 2019-12-05 PROCEDURE — 72148 MRI LUMBAR SPINE W/O DYE: CPT

## 2019-12-05 PROCEDURE — 97110 THERAPEUTIC EXERCISES: CPT

## 2019-12-05 PROCEDURE — 83735 ASSAY OF MAGNESIUM: CPT

## 2019-12-05 PROCEDURE — G0378: CPT

## 2019-12-05 PROCEDURE — 85730 THROMBOPLASTIN TIME PARTIAL: CPT

## 2019-12-05 PROCEDURE — 85027 COMPLETE CBC AUTOMATED: CPT

## 2019-12-05 PROCEDURE — 93005 ELECTROCARDIOGRAM TRACING: CPT

## 2019-12-05 PROCEDURE — 97163 PT EVAL HIGH COMPLEX 45 MIN: CPT

## 2019-12-05 PROCEDURE — 99285 EMERGENCY DEPT VISIT HI MDM: CPT | Mod: 25

## 2019-12-05 PROCEDURE — 82962 GLUCOSE BLOOD TEST: CPT

## 2019-12-05 RX ADMIN — GABAPENTIN 300 MILLIGRAM(S): 400 CAPSULE ORAL at 07:08

## 2019-12-05 RX ADMIN — Medication 10 MILLIGRAM(S): at 07:08

## 2019-12-05 RX ADMIN — CARBIDOPA AND LEVODOPA 1 TABLET(S): 25; 100 TABLET ORAL at 00:23

## 2019-12-05 RX ADMIN — Medication 25 MILLIGRAM(S): at 07:08

## 2019-12-05 RX ADMIN — CARBIDOPA AND LEVODOPA 1 TABLET(S): 25; 100 TABLET ORAL at 12:37

## 2019-12-05 RX ADMIN — ENOXAPARIN SODIUM 30 MILLIGRAM(S): 100 INJECTION SUBCUTANEOUS at 12:37

## 2019-12-05 RX ADMIN — DULOXETINE HYDROCHLORIDE 30 MILLIGRAM(S): 30 CAPSULE, DELAYED RELEASE ORAL at 12:37

## 2019-12-05 RX ADMIN — CARBIDOPA AND LEVODOPA 1 TABLET(S): 25; 100 TABLET ORAL at 07:08

## 2019-12-05 NOTE — PROGRESS NOTE ADULT - SUBJECTIVE AND OBJECTIVE BOX
SUBJECTIVE    Patient sitting in chair this morning and feeling well.  Tolerating diet.  No complaints at the time is ready to go to subacute rehab.    REVIEW OF SYSTEMS    General: Not in any pain	    Skin/Breast: No rash  	  ENMT: No visual problems, no sore throat	    Respiratory and Thorax: No cough, No CP, No SOB  	  Cardiovascular: No CP, No palpitations    Gastrointestinal: No Abd pain, No N/V/D    Musculoskeletal: No Joint pain, No back pain	    Neurological: No headache    Psychiatric: No anxiety      OBJECTIVE    Vital Signs Last 24 Hrs  T(C): 36.7 (12-05-19 @ 15:45), Max: 36.9 (12-04-19 @ 22:38)  T(F): 98 (12-05-19 @ 15:45), Max: 98.5 (12-04-19 @ 22:38)  HR: 89 (12-05-19 @ 15:45) (74 - 89)  BP: 143/89 (12-05-19 @ 15:45) (142/86 - 149/93)  BP(mean): --  RR: 18 (12-05-19 @ 15:45) (18 - 20)  SpO2: 93% (12-05-19 @ 15:45) (93% - 94%)    PHYSICAL EXAM:    Constitutional: Not in any distress    Eyes: No conjunctival injection    ENMT: No oral lesions    Neck: No nodes, no adenopathy    Back: Straight, no defects    Respiratory: clear b/l    Cardiovascular: RRR, no murmur    Gastrointestinal: soft, NT, ND    Extremities: No edema, no erythema    Neurological: no focal deficit    Skin: No rash      MEDICATIONS  (STANDING):  carbidopa/levodopa  25/100 1 Tablet(s) Oral four times a day  DULoxetine 30 milliGRAM(s) Oral daily  enoxaparin Injectable 30 milliGRAM(s) SubCutaneous daily  gabapentin 300 milliGRAM(s) Oral two times a day  influenza   Vaccine 0.5 milliLiter(s) IntraMuscular once  metoprolol succinate ER 25 milliGRAM(s) Oral daily  predniSONE   Tablet 10 milliGRAM(s) Oral two times a day    MEDICATIONS  (PRN):  traMADol 50 milliGRAM(s) Oral every 8 hours PRN Severe Pain (7 - 10)                Allergies    No Known Allergies    Intolerances

## 2019-12-05 NOTE — PROGRESS NOTE ADULT - PROBLEM SELECTOR PLAN 2
Not much improvement, but now on gabapentin 300 mg twice a day.  Can continue that dose and titrate up if still not effective.

## 2019-12-05 NOTE — PROGRESS NOTE ADULT - PROBLEM SELECTOR PLAN 4
On Sinemet 25/1 101-1/2 tablets 4 times a day.  For transfer to subacute rehab for balance training and strengthening.

## 2019-12-05 NOTE — DISCHARGE NOTE NURSING/CASE MANAGEMENT/SOCIAL WORK - PATIENT PORTAL LINK FT
You can access the FollowMyHealth Patient Portal offered by [facility] by registering at the following website: [email or web address]. By joining [employer]’s FollowMyHealth portal, you will also be able to view your health information using other applications (apps) compatible with our system.

## 2020-01-24 ENCOUNTER — INPATIENT (INPATIENT)
Facility: HOSPITAL | Age: 85
LOS: 0 days | Discharge: HOME | DRG: 204 | End: 2020-01-25
Attending: HOSPITALIST | Admitting: HOSPITALIST
Payer: COMMERCIAL

## 2020-01-24 VITALS
WEIGHT: 205.03 LBS | RESPIRATION RATE: 20 BRPM | TEMPERATURE: 98 F | DIASTOLIC BLOOD PRESSURE: 91 MMHG | SYSTOLIC BLOOD PRESSURE: 165 MMHG | OXYGEN SATURATION: 98 % | HEIGHT: 68 IN | HEART RATE: 88 BPM

## 2020-01-24 DIAGNOSIS — I95.9 HYPOTENSION, UNSPECIFIED: ICD-10-CM

## 2020-01-24 LAB
ALBUMIN SERPL ELPH-MCNC: 3.7 G/DL — SIGNIFICANT CHANGE UP (ref 3.3–5.2)
ALP SERPL-CCNC: 73 U/L — SIGNIFICANT CHANGE UP (ref 40–120)
ALT FLD-CCNC: 11 U/L — SIGNIFICANT CHANGE UP
ANION GAP SERPL CALC-SCNC: 12 MMOL/L — SIGNIFICANT CHANGE UP (ref 5–17)
APTT BLD: 27.1 SEC — LOW (ref 27.5–36.3)
AST SERPL-CCNC: 27 U/L — SIGNIFICANT CHANGE UP
BILIRUB SERPL-MCNC: 0.3 MG/DL — LOW (ref 0.4–2)
BUN SERPL-MCNC: 29 MG/DL — HIGH (ref 8–20)
CALCIUM SERPL-MCNC: 8.6 MG/DL — SIGNIFICANT CHANGE UP (ref 8.6–10.2)
CHLORIDE SERPL-SCNC: 105 MMOL/L — SIGNIFICANT CHANGE UP (ref 98–107)
CO2 SERPL-SCNC: 26 MMOL/L — SIGNIFICANT CHANGE UP (ref 22–29)
CREAT SERPL-MCNC: 1.22 MG/DL — SIGNIFICANT CHANGE UP (ref 0.5–1.3)
FLU A RESULT: SIGNIFICANT CHANGE UP
FLU A RESULT: SIGNIFICANT CHANGE UP
FLUAV AG NPH QL: SIGNIFICANT CHANGE UP
FLUBV AG NPH QL: SIGNIFICANT CHANGE UP
GLUCOSE SERPL-MCNC: 124 MG/DL — HIGH (ref 70–99)
HCT VFR BLD CALC: 40.8 % — SIGNIFICANT CHANGE UP (ref 39–50)
HGB BLD-MCNC: 12.1 G/DL — LOW (ref 13–17)
INR BLD: 0.89 RATIO — SIGNIFICANT CHANGE UP (ref 0.88–1.16)
MCHC RBC-ENTMCNC: 29.7 GM/DL — LOW (ref 32–36)
MCHC RBC-ENTMCNC: 30.4 PG — SIGNIFICANT CHANGE UP (ref 27–34)
MCV RBC AUTO: 102.5 FL — HIGH (ref 80–100)
NT-PROBNP SERPL-SCNC: 530 PG/ML — HIGH (ref 0–300)
PLATELET # BLD AUTO: 268 K/UL — SIGNIFICANT CHANGE UP (ref 150–400)
POTASSIUM SERPL-MCNC: 4.8 MMOL/L — SIGNIFICANT CHANGE UP (ref 3.5–5.3)
POTASSIUM SERPL-SCNC: 4.8 MMOL/L — SIGNIFICANT CHANGE UP (ref 3.5–5.3)
PROT SERPL-MCNC: 5.7 G/DL — LOW (ref 6.6–8.7)
PROTHROM AB SERPL-ACNC: 10.2 SEC — SIGNIFICANT CHANGE UP (ref 10–12.9)
RBC # BLD: 3.98 M/UL — LOW (ref 4.2–5.8)
RBC # FLD: 14.2 % — SIGNIFICANT CHANGE UP (ref 10.3–14.5)
RSV RESULT: SIGNIFICANT CHANGE UP
RSV RNA RESP QL NAA+PROBE: SIGNIFICANT CHANGE UP
SODIUM SERPL-SCNC: 143 MMOL/L — SIGNIFICANT CHANGE UP (ref 135–145)
TROPONIN T SERPL-MCNC: 0.01 NG/ML — SIGNIFICANT CHANGE UP (ref 0–0.06)
TROPONIN T SERPL-MCNC: 0.02 NG/ML — SIGNIFICANT CHANGE UP (ref 0–0.06)
WBC # BLD: 9.47 K/UL — SIGNIFICANT CHANGE UP (ref 3.8–10.5)
WBC # FLD AUTO: 9.47 K/UL — SIGNIFICANT CHANGE UP (ref 3.8–10.5)

## 2020-01-24 PROCEDURE — 93010 ELECTROCARDIOGRAM REPORT: CPT

## 2020-01-24 PROCEDURE — 93970 EXTREMITY STUDY: CPT | Mod: 26

## 2020-01-24 PROCEDURE — 73610 X-RAY EXAM OF ANKLE: CPT | Mod: 26,RT

## 2020-01-24 PROCEDURE — 71045 X-RAY EXAM CHEST 1 VIEW: CPT | Mod: 26

## 2020-01-24 PROCEDURE — 71275 CT ANGIOGRAPHY CHEST: CPT | Mod: 26

## 2020-01-24 PROCEDURE — 73110 X-RAY EXAM OF WRIST: CPT | Mod: 26,RT

## 2020-01-24 PROCEDURE — 99218: CPT

## 2020-01-24 RX ORDER — METOPROLOL TARTRATE 50 MG
25 TABLET ORAL DAILY
Refills: 0 | Status: DISCONTINUED | OUTPATIENT
Start: 2020-01-24 | End: 2020-01-25

## 2020-01-24 RX ORDER — CARBIDOPA AND LEVODOPA 25; 100 MG/1; MG/1
1 TABLET ORAL EVERY 8 HOURS
Refills: 0 | Status: DISCONTINUED | OUTPATIENT
Start: 2020-01-24 | End: 2020-01-25

## 2020-01-24 RX ORDER — GABAPENTIN 400 MG/1
300 CAPSULE ORAL
Refills: 0 | Status: DISCONTINUED | OUTPATIENT
Start: 2020-01-24 | End: 2020-01-25

## 2020-01-24 RX ORDER — DULOXETINE HYDROCHLORIDE 30 MG/1
30 CAPSULE, DELAYED RELEASE ORAL DAILY
Refills: 0 | Status: DISCONTINUED | OUTPATIENT
Start: 2020-01-24 | End: 2020-01-25

## 2020-01-24 RX ORDER — TRAMADOL HYDROCHLORIDE 50 MG/1
50 TABLET ORAL THREE TIMES A DAY
Refills: 0 | Status: DISCONTINUED | OUTPATIENT
Start: 2020-01-24 | End: 2020-01-25

## 2020-01-24 RX ORDER — ASPIRIN/CALCIUM CARB/MAGNESIUM 324 MG
81 TABLET ORAL DAILY
Refills: 0 | Status: DISCONTINUED | OUTPATIENT
Start: 2020-01-24 | End: 2020-01-25

## 2020-01-24 RX ADMIN — CARBIDOPA AND LEVODOPA 1 TABLET(S): 25; 100 TABLET ORAL at 22:04

## 2020-01-24 RX ADMIN — TRAMADOL HYDROCHLORIDE 50 MILLIGRAM(S): 50 TABLET ORAL at 22:05

## 2020-01-24 RX ADMIN — TRAMADOL HYDROCHLORIDE 50 MILLIGRAM(S): 50 TABLET ORAL at 22:49

## 2020-01-24 RX ADMIN — GABAPENTIN 300 MILLIGRAM(S): 400 CAPSULE ORAL at 22:04

## 2020-01-24 NOTE — ED CDU PROVIDER INITIAL DAY NOTE - PHYSICAL EXAMINATION
Const: Awake, alert and oriented. In no acute distress. Well appearing.  HEENT: NC/AT. Moist mucous membranes.  Eyes: No scleral icterus. EOMI.  Neck:. Soft and supple. Full ROM without pain.  Cardiac: Regular rate and regular rhythm. +S1/S2. Peripheral pulses 2+ and symmetric. 2+ pitting edema b/l LE to knee, 1+ pitting edema b/l UE hand to elbow.  Resp: Speaking in full sentences. No evidence of respiratory distress. No wheezes, rales or rhonchi.  Abd: Soft, non-tender, non-distended. Normal bowel sounds in all 4 quadrants. No guarding or rebound.  Back: Spine midline and non-tender. No CVAT.  Skin: No rashes, abrasions or lacerations.  Neuro: Awake, alert & oriented x 3. Moves all extremities symmetrically.

## 2020-01-24 NOTE — ED ADULT NURSE REASSESSMENT NOTE - NS ED NURSE REASSESS COMMENT FT1
assumed care of pt @ 1930, report received from Lilly GOMEZ, charting as noted. pt denies any shortness of breath, dyspnea, or chest discomfort at this time. O2 sat 95% on room air, RR 20, pt states he does not wear O2 sat home. pt NSR on cardiac monitor. pt admits to bilateral lower extremity/foot pain and right hand pain. tramadol administered for pain. pt placed on observation for serial trop/ekgs and cards consult. pt agreeable with plan of care. fall precautions in place: stretcher locked in lowest position, call bell in reach, side rails up, non skid socks on.

## 2020-01-24 NOTE — ED STATDOCS - CLINICAL SUMMARY MEDICAL DECISION MAKING FREE TEXT BOX
Plan: 86 y/o M; pmhx signif for Parkinson's disease, neuropathy; presents to the ED with RUE and RLE swelling, which onset PTA. As per daughter at bedside, daughter came home from work and was unable to take pt for a walk and noticed swelling to his right hand and ankle. Pt is not influenza vaccinated. Denies fever, chills, CP, palpitations. 86 y/o M; pmhx signif for Parkinson's disease, neuropathy; presents to the ED with RUE and RLE swelling, began this morning.  daughter reports patient with increasing sob and cough x2-3 days. denies f/c/s.  denies n/v.  gilberto came home from work and was unable to take pt for a walk 2/2 to patient's PAREKH and noticed swelling to his right hand and ankle. patient did not receive his influenza vaccine this season.  denies fall, but was unsupervised for few hours at home and daughter states he has some memory loss, but not dx with dementia.   Focused eval, protocol orders entered. Pt to be moved to main ED for complete evaluation by another provider.

## 2020-01-24 NOTE — ED ADULT NURSE REASSESSMENT NOTE - NS ED NURSE REASSESS COMMENT FT1
pt alert oriented daughter at bedside, resting comfortably at this time on bedside monitor vitals wnl, no acute distress noted offers no complaints.

## 2020-01-24 NOTE — ED CDU PROVIDER INITIAL DAY NOTE - NS ED ROS FT
Gen: denies fever, chills, fatigue, weight loss  Skin: denies rashes, laceration, bruising  HEENT: denies visual changes, ear pain, nasal congestion, throat pain  Respiratory: denies PAREKH, SOB, cough, wheezing  Cardiovascular: +UE and LE edema. denies chest pain, palpitations, diaphoresis  GI: denies abdominal pain, n/v/d  : denies dysuria, frequency, urgency, bowel/bladder incontinence  MSK: denies joint swelling/pain, back pain, neck pain  Neuro: denies headache, dizziness, weakness, numbness  Psych: denies anxiety, depression, SI/HI, visual/auditory hallucinations

## 2020-01-24 NOTE — ED CDU PROVIDER INITIAL DAY NOTE - MEDICAL DECISION MAKING DETAILS
Pt in ED for new b/l UE and LE edema. Labs unremarkable, trop negative x 1. TTE unremarkable 12/2019. Will keep in obs for tele monitoring, serial Alessandro/EKGs and SB Cards consult in AM

## 2020-01-24 NOTE — ED PROVIDER NOTE - CLINICAL SUMMARY MEDICAL DECISION MAKING FREE TEXT BOX
86yo male with PMH PD, HTN, RA presenting with exertional dyspnea and b/l LE edema, R>L, RUE edema- concerning for congestive heart failure vs PE vs ACS- will check labs, ekg, cxr, cta chest, us b/l LE, reassess. Anna Weiss DO

## 2020-01-24 NOTE — ED PROVIDER NOTE - PHYSICAL EXAMINATION
Gen: NAD, AOx3  Head: NCAT  Lung: CTAB, no respiratory distress, no wheezing, rales, rhonchi  CV: normal s1/s2, rrr, no murmurs, Normal perfusion  Abd: soft, NTND  MSK: trace edema b/l LE, no visible deformities, full range of motion in all 4 extremities  Neuro: No focal neurologic deficits  Skin: No rash   Psych: normal affect

## 2020-01-24 NOTE — ED PROVIDER NOTE - OBJECTIVE STATEMENT
84yo male PMH neuropathy, PD, HTN presenting with dyspnea on exertion x 1 day. patient states that this morning he noticed that both legs were swollen as well as R hand. Patient states that he is having shortness of breath with exertion. No orthopnea. No chest pain. No fevers/chills. No diaphoresis.    Cards: [place]

## 2020-01-24 NOTE — ED CDU PROVIDER INITIAL DAY NOTE - OBJECTIVE STATEMENT
86yo male pmhx neuropathy, HTN, parkinson's ds presents to ED for b/l UE and LE edema x 1-2 days. Pt noting increase in swelling, has never happened in past. Pt stating no episodes of CP, SOB or PAREKH, only came to ED due to swelling. Pt had echocardiogram in 12/2019 which showed EF 65-70%, no structural abnormality. No known dx CHF. Pt stating swelling looks a little bit better at this time. Swelling is non-painful. No recent travel. No hx CAD, MI. No stents. Denies fever, chills, CP, SOB, PAREKH, syncope, back pain, headache, dizziness, recent travel.  Cardiology: [facility]

## 2020-01-25 VITALS
RESPIRATION RATE: 19 BRPM | HEART RATE: 75 BPM | OXYGEN SATURATION: 95 % | DIASTOLIC BLOOD PRESSURE: 99 MMHG | SYSTOLIC BLOOD PRESSURE: 161 MMHG | TEMPERATURE: 98 F

## 2020-01-25 LAB — TROPONIN T SERPL-MCNC: 0.01 NG/ML — SIGNIFICANT CHANGE UP (ref 0–0.06)

## 2020-01-25 PROCEDURE — 73610 X-RAY EXAM OF ANKLE: CPT

## 2020-01-25 PROCEDURE — 85610 PROTHROMBIN TIME: CPT

## 2020-01-25 PROCEDURE — 36415 COLL VENOUS BLD VENIPUNCTURE: CPT

## 2020-01-25 PROCEDURE — 99217: CPT

## 2020-01-25 PROCEDURE — 93010 ELECTROCARDIOGRAM REPORT: CPT

## 2020-01-25 PROCEDURE — 73110 X-RAY EXAM OF WRIST: CPT

## 2020-01-25 PROCEDURE — 85027 COMPLETE CBC AUTOMATED: CPT

## 2020-01-25 PROCEDURE — 87631 RESP VIRUS 3-5 TARGETS: CPT

## 2020-01-25 PROCEDURE — 93005 ELECTROCARDIOGRAM TRACING: CPT

## 2020-01-25 PROCEDURE — 71045 X-RAY EXAM CHEST 1 VIEW: CPT

## 2020-01-25 PROCEDURE — 99284 EMERGENCY DEPT VISIT MOD MDM: CPT

## 2020-01-25 PROCEDURE — 80053 COMPREHEN METABOLIC PANEL: CPT

## 2020-01-25 PROCEDURE — 85730 THROMBOPLASTIN TIME PARTIAL: CPT

## 2020-01-25 PROCEDURE — 71275 CT ANGIOGRAPHY CHEST: CPT

## 2020-01-25 PROCEDURE — 93970 EXTREMITY STUDY: CPT

## 2020-01-25 PROCEDURE — 83880 ASSAY OF NATRIURETIC PEPTIDE: CPT

## 2020-01-25 PROCEDURE — 84484 ASSAY OF TROPONIN QUANT: CPT

## 2020-01-25 PROCEDURE — G0378: CPT

## 2020-01-25 RX ADMIN — Medication 81 MILLIGRAM(S): at 09:04

## 2020-01-25 RX ADMIN — CARBIDOPA AND LEVODOPA 1 TABLET(S): 25; 100 TABLET ORAL at 05:25

## 2020-01-25 RX ADMIN — DULOXETINE HYDROCHLORIDE 30 MILLIGRAM(S): 30 CAPSULE, DELAYED RELEASE ORAL at 09:04

## 2020-01-25 RX ADMIN — GABAPENTIN 300 MILLIGRAM(S): 400 CAPSULE ORAL at 05:25

## 2020-01-25 RX ADMIN — Medication 25 MILLIGRAM(S): at 05:25

## 2020-01-25 NOTE — ED CDU PROVIDER SUBSEQUENT DAY NOTE - HISTORY
84yo male in ED for UE and LE edema. Pt feeling well at this time, no CP, no SOB. Trops negative x 3, ekgs stable, non-ischemic. Pending [facility] Consult

## 2020-01-25 NOTE — CONSULT NOTE ADULT - SUBJECTIVE AND OBJECTIVE BOX
[facility]                                   [address] [place] [address]                                                      PHONE: [phone]                                                         FAX: [phone]  [email or web address]  ---------------------------------------------------------------------------------------------------------------------------------    Reason for Consult: LE edema, PAREKH    HPI:  CORINE LOPEZ is an 85y Male h/o parkinson's disease and peripheral neuropathy presents c/o LE edema and PAREKH x 1 day. Sx's resolved while here. He is currently asymptomatic. He reports elevating his legs resolved edema. He has had edema in the past. A 2D echo last month showed EF 65-70% and no significant valve disease. His wife  2 mo ago. His daughter helps care for him. CTA chest was negative.    PAST MEDICAL & SURGICAL HISTORY:  Neuropathy  Parkinsons disease  No significant past surgical history      No Known Allergies      MEDICATIONS  (STANDING):  aspirin  chewable 81 milliGRAM(s) Oral daily  carbidopa/levodopa  25/100 1 Tablet(s) Oral every 8 hours  DULoxetine 30 milliGRAM(s) Oral daily  gabapentin 300 milliGRAM(s) Oral two times a day  metoprolol succinate ER 25 milliGRAM(s) Oral daily    MEDICATIONS  (PRN):  traMADol 50 milliGRAM(s) Oral three times a day PRN Moderate Pain (4 - 6)      Social History:  Cigarettes:      no              Alchohol:     no            Illicit Drug Abuse:   no    ROS: Negative other than as mentioned in HPI.    Vital Signs Last 24 Hrs  T(C): 36.9 (2020 04:28), Max: 36.9 (2020 14:25)  T(F): 98.4 (2020 04:28), Max: 98.4 (2020 14:25)  HR: 82 (2020 04:28) (80 - 88)  BP: 144/82 (2020 04:28) (136/72 - 165/91)  BP(mean): --  RR: 18 (2020 04:28) (16 - 20)  SpO2: 99% (2020 04:28) (94% - 99%)  ICU Vital Signs Last 24 Hrs  KESHIAALEKSANDR PERRYGANO  I&O's Detail    I&O's Summary    Drug Dosing Weight  CORINE LOPEZ      PHYSICAL EXAM:  General: NAD.  HEENT: Head; normocephalic.  Eyes: Pupils reactive.  Neck: Supple.  CARDIOVASCULAR: Normal S1 and S2.   LUNGS: No rales, rhonchi or wheeze. Normal breath sounds bilaterally.  ABDOMEN: Soft, nontender.  EXTREMITIES: No clubbing, cyanosis. No significant edema.  SKIN: warm and dry.  NEURO: Alert/oriented x 3.    PSYCH: normal affect.        LABS:                        12.1   9.47  )-----------( 268      ( 2020 17:35 )             40.8     24    143  |  105  |  29.0<H>  ----------------------------<  124<H>  4.8   |  26.0  |  1.22    Ca    8.6      2020 17:35    TPro  5.7<L>  /  Alb  3.7  /  TBili  0.3<L>  /  DBili  x   /  AST  27  /  ALT  11  /  AlkPhos  73      CORINE QUINONEZO  CARDIAC MARKERS ( 2020 01:00 )  x     / 0.01 ng/mL / x     / x     / x      CARDIAC MARKERS ( 2020 21:55 )  x     / 0.02 ng/mL / x     / x     / x      CARDIAC MARKERS ( 2020 17:35 )  x     / 0.01 ng/mL / x     / x     / x          PT/INR - ( 2020 17:35 )   PT: 10.2 sec;   INR: 0.89 ratio         PTT - ( 2020 17:35 )  PTT:27.1 sec      RADIOLOGY & ADDITIONAL STUDIES:    INTERPRETATION OF TELEMETRY (personally reviewed): NSR    ECG: NSR 78, RBBB 144ms (same as EKG 19)    ECHO:   1. Left ventricular ejection fraction, by visual estimation, is 65 to   70%.   2. Technically difficult study.   3. Normal global left ventricular systolic function.   4. LV Ejection Fraction by Shelby's Method with a biplane EF of 66 %.   5. Normal left ventricular internal cavity size.   6. Spectral Doppler shows impaired relaxation pattern of left   ventricular myocardial filling (Grade I diastolic dysfunction).   7. Normal right ventricular size and function.   8. There is no evidence of pericardial effusion.   9. 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    MD Jules Electronically signed on 2019 at 1:33:08 PM      Chest CTA:  No pulmonary emboli visualized.  Faint bilateral groundglass nodular opacities as described on the prior study from 2019 which has not significantly changed. Recommend a 3 month follow-up with low-dose noncontrast chest CT scan.  Stable bilateral scattered calcified pleural densities. Is there history of asbestos exposure?        Assessment and Plan:  In summary, CORINE LOPEZ is an 85y Male with past medical history significant for parkinson's disease and peripheral neuropathy presents c/o LE edema and PAREKH x 1 day. Sx's resolved while here. He is currently asymptomatic. He reports elevating his legs resolved edema. He has had edema in the past. A 2D echo last month showed EF 65-70% and no significant valve disease. His wife  2 mo ago. His daughter helps care for him. CTA chest was negative. Negative CE's.    1. Unclear etiology of sx's and LE edema, now resolved. Negative w/u here. Recent 2D echo as above. No evidence of CHF.  2. Stable from cardiac standpoint for d/c home.  3. Outpatient cardiology f/u for stress testing for further cardiac risk stratification.

## 2020-01-25 NOTE — ED CDU PROVIDER DISPOSITION NOTE - CLINICAL COURSE
84yo male pmhx neuropathy, HTN, parkinson's ds presents to ED for b/l UE and LE edema x 1-2 days. Pt noting increase in swelling, has never happened in past. Pt stating no episodes of CP, SOB or PAREKH, only came to ED due to swelling. PT was placed in observation with improvement of symptoms over night. PT was evaluated by cardio this morning and consulted appreciated. Results were discussed with PT and ED return precautions discussed. PT will follow up with PMD and cardio.

## 2020-01-25 NOTE — ED CDU PROVIDER DISPOSITION NOTE - PATIENT PORTAL LINK FT
You can access the FollowMyHealth Patient Portal offered by [facility] by registering at the following website: [email or web address]. By joining [employer]’s FollowMyHealth portal, you will also be able to view your health information using other applications (apps) compatible with our system.

## 2020-01-25 NOTE — ED CDU PROVIDER DISPOSITION NOTE - NSFOLLOWUPINSTRUCTIONS_ED_ALL_ED_FT
Please follow up with your cardiologist within 4 days. Please follow up with your doctor within 48 hours. Return to the ER if symptoms worsen, chest pain, shortness of breath, cough, fever, chills, increased swelling, palpitations, or any new concerns. Elevate lower extremities as much as possible

## 2020-01-25 NOTE — ED CDU PROVIDER DISPOSITION NOTE - CARE PROVIDER_API CALL
Sammy Grande (MD)  Cardiovascular Disease; Internal Medicine  [address] [place] [address]  Phone: [phone]  Fax: [phone]  Follow Up Time:

## 2020-01-25 NOTE — ED CDU PROVIDER DISPOSITION NOTE - ATTENDING CONTRIBUTION TO CARE
Rula CRESPO- 86 Y/O M with h/o parkinsons placed in cdu for leg edema and progressively increased over few day, no sob, chest pain. Pt got better after he raised his leg, seen by cardio    pt is alert, well appearing male with baseline tremors and parkinson changes but able to ambulate and function on own, s1s2 normal reg, b/l clear breath sounds, abd soft, nt, nd, normal bowel sounds, neuro exam aox3, cn 2-12 intact, no focal deficits, skin warm, dry, good turgor    plan to discharge, keep low salt diet and elevate legs frequently

## 2020-01-30 NOTE — ED PROVIDER NOTE - INPATIENT RESIDENT/ACP NOTIFIED
[facility] Department of Anesthesiology  Pre-Anesthesia Evaluation/Consultation       Name:  Jed Smallwood  : 1989  Age:  35 y.o. MRN:  [id number]  Date: 2020           Surgeon: Surgeon(s):  Lewis Meza MD    Procedure: Procedure(s):  LEFT CARPAL TUNNEL RELEASE     Allergies   Allergen Reactions    Pcn [Penicillins] Hives and Rash     Patient Active Problem List   Diagnosis    Moderate episode of recurrent major depressive disorder ([address]    Primary insomnia    Migraine without aura and without status migrainosus, not intractable     Past Medical History:   Diagnosis Date    Bilateral carpal tunnel syndrome     Depression      History reviewed. No pertinent surgical history. Social History     Tobacco Use    Smoking status: Never Smoker    Smokeless tobacco: Never Used   Substance Use Topics    Alcohol use: Yes     Alcohol/week: 2.0 standard drinks     Types: 2 Shots of liquor per week     Comment: rare    Drug use: No     Medications  No current facility-administered medications on file prior to encounter.       Current Outpatient Medications on File Prior to Encounter   Medication Sig Dispense Refill    escitalopram (LEXAPRO) 10 MG tablet Take 1 tablet by mouth daily 30 tablet 5     Current Facility-Administered Medications   Medication Dose Route Frequency Provider Last Rate Last Dose    0.9 % sodium chloride infusion   Intravenous Continuous Jacob Chapman MD        sodium chloride flush 0.9 % injection 10 mL  10 mL Intravenous 2 times per day Jacob Chapman MD        sodium chloride flush 0.9 % injection 10 mL  10 mL Intravenous PRN Jacob Chapman MD         Vital Signs (Current)   Vitals:    20 1720 20 0932 20 0941   BP:   118/71   Pulse:   90   Resp:   16   Temp:   97.8 °F (36.6 °C)   TempSrc:   Temporal   SpO2:   97%   Weight: 233 lb (105.7 kg) 230 lb 9.6 oz (104.6 kg)    Height: 5' 3\" (1.6 m) 5' 3\" (1.6 m) BP Readings from Last 3 Encounters:   20 118/71   20 114/72   19 109/78     Vital Signs Statistics (for past 48 hrs)     Temp  Av.8 °F (36.6 °C)  Min: 97.8 °F (36.6 °C)   Min taken time: 20  Max: 97.8 °F (36.6 °C)   Max taken time: 20  Pulse  Av  Min: 90   Min taken time: 20  Max: 90   Max taken time: 20  Resp  Av  Min: 16   Min taken time: 20  Max: 16   Max taken time: 20  BP  Min: 118/71   Min taken time: 20  Max: 118/71   Max taken time: 20  SpO2  Av %  Min: 97 %   Min taken time: 20  Max: 97 %   Max taken time: 20  BP Readings from Last 3 Encounters:   20 118/71   20 114/72   19 109/78       BMI  Body mass index is 40.85 kg/m². Estimated body mass index is 40.85 kg/m² as calculated from the following:    Height as of this encounter: 5' 3\" (1.6 m). Weight as of this encounter: 230 lb 9.6 oz (104.6 kg). CBC No results found for: WBC, RBC, HGB, HCT, MCV, RDW, PLT  CMP  No results found for: NA, K, CL, CO2, BUN, CREATININE, GFRAA, AGRATIO, LABGLOM, GLUCOSE, PROT, CALCIUM, BILITOT, ALKPHOS, AST, ALT  BMP  No results found for: NA, K, CL, CO2, BUN, CREATININE, CALCIUM, GFRAA, LABGLOM, GLUCOSE  POCGlucose  No results for input(s): GLUCOSE in the last 72 hours.    Coags  No results found for: PROTIME, INR, APTT  HCG (If Applicable) No results found for: PREGTESTUR, PREGSERUM, HCG, HCGQUANT   ABGs No results found for: PHART, PO2ART, KLL1URR, ZNV0WIJ, BEART, W0OGWDXS   Type & Screen (If Applicable)  Lab Results   Component Value Date    LABABO O 2012    LABRH Positive 2012                            BMI: Wt Readings from Last 3 Encounters:       NPO Status:                          Anesthesia Evaluation  Patient summary reviewed  Airway: Mallampati: III  TM distance: >3 FB   Neck ROM: full  Mouth Macy LOUIS

## 2020-04-17 ENCOUNTER — INPATIENT (INPATIENT)
Facility: HOSPITAL | Age: 85
LOS: 2 days | Discharge: NURSING, REHAB OR LONG-TERM CARE | DRG: 177 | End: 2020-04-20
Attending: HOSPITALIST | Admitting: HOSPITALIST
Payer: MEDICARE

## 2020-04-17 VITALS
WEIGHT: 209 LBS | HEIGHT: 68 IN | TEMPERATURE: 99 F | HEART RATE: 90 BPM | OXYGEN SATURATION: 98 % | DIASTOLIC BLOOD PRESSURE: 51 MMHG | SYSTOLIC BLOOD PRESSURE: 95 MMHG | RESPIRATION RATE: 30 BRPM

## 2020-04-17 DIAGNOSIS — U07.1 COVID-19: ICD-10-CM

## 2020-04-17 LAB
ALBUMIN SERPL ELPH-MCNC: 3.4 G/DL — SIGNIFICANT CHANGE UP (ref 3.3–5.2)
ALP SERPL-CCNC: 58 U/L — SIGNIFICANT CHANGE UP (ref 40–120)
ALT FLD-CCNC: <5 U/L — SIGNIFICANT CHANGE UP
ANION GAP SERPL CALC-SCNC: 17 MMOL/L — SIGNIFICANT CHANGE UP (ref 5–17)
AST SERPL-CCNC: 34 U/L — SIGNIFICANT CHANGE UP
BASE EXCESS BLDV CALC-SCNC: 1.3 MMOL/L — SIGNIFICANT CHANGE UP (ref -2–2)
BASOPHILS # BLD AUTO: 0.01 K/UL — SIGNIFICANT CHANGE UP (ref 0–0.2)
BASOPHILS NFR BLD AUTO: 0.1 % — SIGNIFICANT CHANGE UP (ref 0–2)
BILIRUB SERPL-MCNC: 0.7 MG/DL — SIGNIFICANT CHANGE UP (ref 0.4–2)
BUN SERPL-MCNC: 30 MG/DL — HIGH (ref 8–20)
CALCIUM SERPL-MCNC: 8.7 MG/DL — SIGNIFICANT CHANGE UP (ref 8.6–10.2)
CHLORIDE SERPL-SCNC: 97 MMOL/L — LOW (ref 98–107)
CO2 SERPL-SCNC: 25 MMOL/L — SIGNIFICANT CHANGE UP (ref 22–29)
CREAT SERPL-MCNC: 2.1 MG/DL — HIGH (ref 0.5–1.3)
CRP SERPL-MCNC: 16.19 MG/DL — HIGH (ref 0–0.4)
EOSINOPHIL # BLD AUTO: 0 K/UL — SIGNIFICANT CHANGE UP (ref 0–0.5)
EOSINOPHIL NFR BLD AUTO: 0 % — SIGNIFICANT CHANGE UP (ref 0–6)
FERRITIN SERPL-MCNC: 328 NG/ML — SIGNIFICANT CHANGE UP (ref 30–400)
GAS PNL BLDV: SIGNIFICANT CHANGE UP
GLUCOSE SERPL-MCNC: 95 MG/DL — SIGNIFICANT CHANGE UP (ref 70–99)
HCO3 BLDV-SCNC: 25 MMOL/L — SIGNIFICANT CHANGE UP (ref 20–26)
HCT VFR BLD CALC: 38.3 % — LOW (ref 39–50)
HGB BLD-MCNC: 12 G/DL — LOW (ref 13–17)
IMM GRANULOCYTES NFR BLD AUTO: 0.8 % — SIGNIFICANT CHANGE UP (ref 0–1.5)
LDH SERPL L TO P-CCNC: 252 U/L — HIGH (ref 98–192)
LYMPHOCYTES # BLD AUTO: 0.59 K/UL — LOW (ref 1–3.3)
LYMPHOCYTES # BLD AUTO: 6.3 % — LOW (ref 13–44)
MCHC RBC-ENTMCNC: 30.3 PG — SIGNIFICANT CHANGE UP (ref 27–34)
MCHC RBC-ENTMCNC: 31.3 GM/DL — LOW (ref 32–36)
MCV RBC AUTO: 96.7 FL — SIGNIFICANT CHANGE UP (ref 80–100)
MONOCYTES # BLD AUTO: 0.6 K/UL — SIGNIFICANT CHANGE UP (ref 0–0.9)
MONOCYTES NFR BLD AUTO: 6.4 % — SIGNIFICANT CHANGE UP (ref 2–14)
NEUTROPHILS # BLD AUTO: 8.05 K/UL — HIGH (ref 1.8–7.4)
NEUTROPHILS NFR BLD AUTO: 86.4 % — HIGH (ref 43–77)
PCO2 BLDV: 52 MMHG — HIGH (ref 35–50)
PH BLDV: 7.34 — SIGNIFICANT CHANGE UP (ref 7.32–7.43)
PLATELET # BLD AUTO: 326 K/UL — SIGNIFICANT CHANGE UP (ref 150–400)
PO2 BLDV: 47 MMHG — HIGH (ref 25–45)
POTASSIUM SERPL-MCNC: 4.4 MMOL/L — SIGNIFICANT CHANGE UP (ref 3.5–5.3)
POTASSIUM SERPL-SCNC: 4.4 MMOL/L — SIGNIFICANT CHANGE UP (ref 3.5–5.3)
PROCALCITONIN SERPL-MCNC: 0.69 NG/ML — HIGH (ref 0.02–0.1)
PROT SERPL-MCNC: 6.3 G/DL — LOW (ref 6.6–8.7)
RBC # BLD: 3.96 M/UL — LOW (ref 4.2–5.8)
RBC # FLD: 13.2 % — SIGNIFICANT CHANGE UP (ref 10.3–14.5)
SAO2 % BLDV: 79 % — SIGNIFICANT CHANGE UP
SARS-COV-2 RNA SPEC QL NAA+PROBE: DETECTED
SODIUM SERPL-SCNC: 139 MMOL/L — SIGNIFICANT CHANGE UP (ref 135–145)
WBC # BLD: 9.32 K/UL — SIGNIFICANT CHANGE UP (ref 3.8–10.5)
WBC # FLD AUTO: 9.32 K/UL — SIGNIFICANT CHANGE UP (ref 3.8–10.5)

## 2020-04-17 PROCEDURE — 93970 EXTREMITY STUDY: CPT | Mod: 26,CS

## 2020-04-17 PROCEDURE — 71045 X-RAY EXAM CHEST 1 VIEW: CPT | Mod: 26

## 2020-04-17 PROCEDURE — 99285 EMERGENCY DEPT VISIT HI MDM: CPT | Mod: CS

## 2020-04-17 PROCEDURE — 93010 ELECTROCARDIOGRAM REPORT: CPT

## 2020-04-17 PROCEDURE — 70450 CT HEAD/BRAIN W/O DYE: CPT | Mod: 26

## 2020-04-17 PROCEDURE — 99223 1ST HOSP IP/OBS HIGH 75: CPT | Mod: CS

## 2020-04-17 RX ORDER — FUROSEMIDE 40 MG
40 TABLET ORAL ONCE
Refills: 0 | Status: COMPLETED | OUTPATIENT
Start: 2020-04-17 | End: 2020-04-17

## 2020-04-17 RX ORDER — ALBUTEROL 90 UG/1
2 AEROSOL, METERED ORAL EVERY 4 HOURS
Refills: 0 | Status: DISCONTINUED | OUTPATIENT
Start: 2020-04-17 | End: 2020-04-20

## 2020-04-17 RX ORDER — METOPROLOL TARTRATE 50 MG
25 TABLET ORAL DAILY
Refills: 0 | Status: DISCONTINUED | OUTPATIENT
Start: 2020-04-17 | End: 2020-04-20

## 2020-04-17 RX ORDER — CARBIDOPA AND LEVODOPA 25; 100 MG/1; MG/1
1.5 TABLET ORAL
Refills: 0 | Status: DISCONTINUED | OUTPATIENT
Start: 2020-04-17 | End: 2020-04-20

## 2020-04-17 RX ORDER — HYDROXYCHLOROQUINE SULFATE 200 MG
400 TABLET ORAL EVERY 24 HOURS
Refills: 0 | Status: DISCONTINUED | OUTPATIENT
Start: 2020-04-18 | End: 2020-04-20

## 2020-04-17 RX ORDER — GUAIFENESIN/DEXTROMETHORPHAN 600MG-30MG
10 TABLET, EXTENDED RELEASE 12 HR ORAL EVERY 4 HOURS
Refills: 0 | Status: DISCONTINUED | OUTPATIENT
Start: 2020-04-17 | End: 2020-04-20

## 2020-04-17 RX ORDER — BUDESONIDE AND FORMOTEROL FUMARATE DIHYDRATE 160; 4.5 UG/1; UG/1
2 AEROSOL RESPIRATORY (INHALATION)
Refills: 0 | Status: DISCONTINUED | OUTPATIENT
Start: 2020-04-17 | End: 2020-04-20

## 2020-04-17 RX ORDER — HYDROXYCHLOROQUINE SULFATE 200 MG
TABLET ORAL
Refills: 0 | Status: DISCONTINUED | OUTPATIENT
Start: 2020-04-17 | End: 2020-04-20

## 2020-04-17 RX ORDER — FUROSEMIDE 40 MG
40 TABLET ORAL DAILY
Refills: 0 | Status: DISCONTINUED | OUTPATIENT
Start: 2020-04-18 | End: 2020-04-18

## 2020-04-17 RX ORDER — HYDROXYCHLOROQUINE SULFATE 200 MG
800 TABLET ORAL EVERY 24 HOURS
Refills: 0 | Status: COMPLETED | OUTPATIENT
Start: 2020-04-17 | End: 2020-04-17

## 2020-04-17 RX ORDER — DULOXETINE HYDROCHLORIDE 30 MG/1
30 CAPSULE, DELAYED RELEASE ORAL DAILY
Refills: 0 | Status: DISCONTINUED | OUTPATIENT
Start: 2020-04-17 | End: 2020-04-20

## 2020-04-17 RX ORDER — ACETAMINOPHEN 500 MG
650 TABLET ORAL EVERY 4 HOURS
Refills: 0 | Status: DISCONTINUED | OUTPATIENT
Start: 2020-04-17 | End: 2020-04-20

## 2020-04-17 RX ORDER — ACETAMINOPHEN 500 MG
650 TABLET ORAL ONCE
Refills: 0 | Status: COMPLETED | OUTPATIENT
Start: 2020-04-17 | End: 2020-04-17

## 2020-04-17 RX ORDER — GABAPENTIN 400 MG/1
300 CAPSULE ORAL
Refills: 0 | Status: DISCONTINUED | OUTPATIENT
Start: 2020-04-17 | End: 2020-04-20

## 2020-04-17 RX ORDER — CEFTRIAXONE 500 MG/1
1000 INJECTION, POWDER, FOR SOLUTION INTRAMUSCULAR; INTRAVENOUS ONCE
Refills: 0 | Status: COMPLETED | OUTPATIENT
Start: 2020-04-17 | End: 2020-04-17

## 2020-04-17 RX ORDER — ENOXAPARIN SODIUM 100 MG/ML
40 INJECTION SUBCUTANEOUS EVERY 12 HOURS
Refills: 0 | Status: DISCONTINUED | OUTPATIENT
Start: 2020-04-17 | End: 2020-04-20

## 2020-04-17 RX ORDER — PANTOPRAZOLE SODIUM 20 MG/1
40 TABLET, DELAYED RELEASE ORAL
Refills: 0 | Status: DISCONTINUED | OUTPATIENT
Start: 2020-04-17 | End: 2020-04-20

## 2020-04-17 RX ORDER — ASPIRIN/CALCIUM CARB/MAGNESIUM 324 MG
1 TABLET ORAL
Qty: 0 | Refills: 0 | DISCHARGE

## 2020-04-17 RX ADMIN — Medication 800 MILLIGRAM(S): at 18:33

## 2020-04-17 RX ADMIN — Medication 80 MILLIGRAM(S): at 18:25

## 2020-04-17 RX ADMIN — Medication 650 MILLIGRAM(S): at 14:00

## 2020-04-17 RX ADMIN — ENOXAPARIN SODIUM 40 MILLIGRAM(S): 100 INJECTION SUBCUTANEOUS at 18:25

## 2020-04-17 RX ADMIN — CEFTRIAXONE 100 MILLIGRAM(S): 500 INJECTION, POWDER, FOR SOLUTION INTRAMUSCULAR; INTRAVENOUS at 15:00

## 2020-04-17 RX ADMIN — CEFTRIAXONE 1000 MILLIGRAM(S): 500 INJECTION, POWDER, FOR SOLUTION INTRAMUSCULAR; INTRAVENOUS at 16:00

## 2020-04-17 RX ADMIN — GABAPENTIN 300 MILLIGRAM(S): 400 CAPSULE ORAL at 18:33

## 2020-04-17 RX ADMIN — Medication 40 MILLIGRAM(S): at 18:25

## 2020-04-17 NOTE — ED ADULT TRIAGE NOTE - CHIEF COMPLAINT QUOTE
pt arrive by ambulance from home with reports that he is COVID + and was told by his PMD he should come to the ED. pt without any immediate complaints but admits to having trouble breathing. noted to be tachypneic. denying feeling SOB.

## 2020-04-17 NOTE — ED ADULT NURSE NOTE - SUICIDE SCREENING QUESTION 3
-- Message is from the [facility]--    Reason for Call: Patient is requesting that Ever Garcia extends her medication for methylPREDNISolone (MEDROL DOSEPAK) 4 MG tablet. Patient also stated she will be going out of town soon. Please contact patient to assist.     Caller Information       Type Contact Phone    05/02/2019 07:38 AM Phone (Incoming) Flesch, Shauna (Self) [phone] (M)          Alternative phone number: [phone]    Turnaround time given to caller:   \"This message will be sent to [state Provider's name]. The clinical team will fulfill your request as soon as they review your message.\"     No

## 2020-04-17 NOTE — H&P ADULT - NSHPREVIEWOFSYSTEMS_GEN_ALL_CORE
Review of Systems:  CONSTITUTIONAL: +weakness   EYES/ENT: No visual changes;  No vertigo or throat pain   NECK: No pain or stiffness  RESPIRATORY: +cough; No shortness of breath,   CARDIOVASCULAR: No chest pain or palpitations  GASTROINTESTINAL: No abdominal or epigastric pain. No nausea, vomiting, or hematemesis; No diarrhea or constipation.   GENITOURINARY: No dysuria, frequency or hematuria  NEUROLOGICAL: No numbness or weakness  SKIN: No itching, burning, rashes, or lesions   All other review of systems is negative unless indicated above

## 2020-04-17 NOTE — H&P ADULT - HISTORY OF PRESENT ILLNESS
85 year old male with pmh of parkinsons, COPD, htn, rheumatoid arthritis on chronic steroids, chf, gerd coming to hospital after being sent in by  for desaturation. patient poor historian and history provided by family. states started 2 days prior. was given azithro and tylenol by pmd yesterday. was 85% on room air at  and 89% in ED. patient staing having cough and weakness and not sleeping much. Denies SOB. discharged from nursing home one week prior

## 2020-04-17 NOTE — ED ADULT NURSE NOTE - OBJECTIVE STATEMENT
Pt BIBA for SOB and low 02 sats per pt's daughter. Pt denies c/o at this time and denies wanting to come to hospital

## 2020-04-17 NOTE — ED ADULT NURSE REASSESSMENT NOTE - NS ED NURSE REASSESS COMMENT FT1
Chest tube placed by OLGA LOUIS to left posterior back with 1200 ml's serous fluid returned. PA clamped tube and states will open to suction in 15 min's. Pt tolerated the procedure w/o diff or c/o

## 2020-04-17 NOTE — CHART NOTE - NSCHARTNOTEFT_GEN_A_CORE
patient hpi received from patient family Cece  [phone]. hospital course to date reviewed. all questions answered

## 2020-04-17 NOTE — ED ADULT NURSE NOTE - NSIMPLEMENTINTERV_GEN_ALL_ED
Implemented All Fall Risk Interventions:  [place] to call system. Call bell, personal items and telephone within reach. Instruct patient to call for assistance. Room bathroom lighting operational. Non-slip footwear when patient is off stretcher. Physically safe environment: no spills, clutter or unnecessary equipment. Stretcher in lowest position, wheels locked, appropriate side rails in place. Provide visual cue, wrist band, yellow gown, etc. Monitor gait and stability. Monitor for mental status changes and reorient to person, place, and time. Review medications for side effects contributing to fall risk. Reinforce activity limits and safety measures with patient and family.

## 2020-04-17 NOTE — H&P ADULT - NSHPPHYSICALEXAM_GEN_ALL_CORE
Vital Signs Last 24 Hrs  T(F): 98.6 (17 Apr 2020 12:26), Max: 98.6 (17 Apr 2020 12:26)  HR: 86 (17 Apr 2020 15:00) (86 - 90)  BP: 107/70 (17 Apr 2020 15:00) (95/51 - 107/70)  RR: 24 (17 Apr 2020 15:00) (24 - 30)  SpO2: 94% (17 Apr 2020 15:00) (94% - 98%)    Physical Exam:  Constitutional: NAD  Neck: Soft and supple, No LAD, No JVD  Respiratory: +rhonchi  Cardiovascular: +s1/s2 no edema b/l le  Gastrointestinal: soft nt nd bs+  Vascular: 2+ peripheral pulses  Neurological: no focal deficits  Musculoskeletal: 5/5 strength b/l upper and lower extremities  : Contraindicated  Breasts: Contraindicated  Rectal: Contraindicated Vital Signs Last 24 Hrs  T(F): 98.6 (17 Apr 2020 12:26), Max: 98.6 (17 Apr 2020 12:26)  HR: 86 (17 Apr 2020 15:00) (86 - 90)  BP: 107/70 (17 Apr 2020 15:00) (95/51 - 107/70)  RR: 24 (17 Apr 2020 15:00) (24 - 30)  SpO2: 94% (17 Apr 2020 15:00) (94% - 98%)    Physical Exam:  Constitutional: NAD  Neck: Soft and supple, No LAD, No JVD  Respiratory: +rhonchi  Cardiovascular: +s1/s2 no edema b/l le  Gastrointestinal: soft nt nd bs+  Vascular: 2+ peripheral pulses  Neurological: no focal deficits  Musculoskeletal: 5/5 strength b/l upper and lower extremities  Skin: left foot skin tear noted near great toe superficial  : Contraindicated  Breasts: Contraindicated  Rectal: Contraindicated Vital Signs Last 24 Hrs  T(F): 98.6 (17 Apr 2020 12:26), Max: 98.6 (17 Apr 2020 12:26)  HR: 86 (17 Apr 2020 15:00) (86 - 90)  BP: 107/70 (17 Apr 2020 15:00) (95/51 - 107/70)  RR: 24 (17 Apr 2020 15:00) (24 - 30)  SpO2: 94% (17 Apr 2020 15:00) (94% - 98%)    Physical Exam:  Constitutional: NAD  Neck: Soft and supple, No LAD, No JVD  Respiratory: +rhonchi  Cardiovascular: +s1/s2 +3 pitting edema b/l le  Gastrointestinal: soft nt nd bs+  Vascular: 2+ peripheral pulses  Neurological: no focal deficits  Musculoskeletal: 5/5 strength b/l upper and lower extremities  Skin: left foot skin tear noted near great toe superficial  : Contraindicated  Breasts: Contraindicated  Rectal: Contraindicated

## 2020-04-17 NOTE — ED PROVIDER NOTE - OBJECTIVE STATEMENT
Patient 85 year old male with history of Parkinsons presenting with fever, cough, weakness, sob. According to daughter Jane, symptoms began 2 days ago with tmax 103. No sick contacts. Started on zpack and tylenol yesterday. Pt went to urgent care today with hopes of obtaining covid test but on arrival pt was found o be hypoxic to 85%. Sent to ED for eval. her pt complains of weakness and cough. Denies SOB.

## 2020-04-17 NOTE — H&P ADULT - ASSESSMENT
85 year old male with pmh of parkinsons, COPD, htn, rheumatoid arthritis on chronic steroids, chf, gerd coming to hospital after being sent in by  for desaturation. patient poor historian and history provided by family. states started 2 days prior. was given azithro and tylenol by pmd yesterday. was 85% on room air at  and 89% in ED. patient staing having cough and weakness and not sleeping much. Denies SOB.    #acute hypoxic respiratory failure  - 2/2 viral pna 2/2 covid 19  - isolation precautions  - monitor vitals  - hydroxychloroquine  - imaging as above   - tylenol for fever  - self prone  - albuterol prn  - iv steroids   - hold further abx     #lethargy and confusion bouts  - check ct head   - ?related to lack of sleep or advancing of parkinsons vs debility as recently d/c from nursing home one week prior     #acute chf diastolic type  - lvef 65% 12/2019  - lasix   - monitor i and o   - daily weight  - fluid restriction  - metoprolol    #htn  - monitor blood pressure  - metoprolol    #gerd  - ppi    #copd  - symbicort interchange    #maria luisa  - cr 01/2020 1.22  - monitor cr  - may be 2/2 renal congestion 2/2 acute chf   - consider renal consult and u/s if not improving   - avoid nephrotoxic medications     #parkinsons disease  - sinemet    #dvt prophylaxis  - lovenox SC   IMPROVE VTE Individual Risk Assessment    RISK                                                                Points  [  ] Previous VTE                                                  3  [  ] Thrombophilia                                               2  [  ] Lower limb paralysis                                      2        (unable to hold up >15 seconds)    [  ] Current Cancer                                              2         (within 6 months)  [  ] Immobilization > 24 hrs                                1  [  ] ICU/CCU stay > 24 hours                              1  [x  ] Age > 60                                                      1    IMPROVE VTE Score ___1______    IMPROVE Score 0-1: Low Risk, No VTE prophylaxis required for most patients, encourage ambulation.   IMPROVE Score 2-3: At risk, pharmacologic VTE prophylaxis is indicated for most patients (in the absence of a contraindication)  IMPROVE Score > or = 4: High Risk, pharmacologic VTE prophylaxis is indicated for most patients (in the absence of a contraindication)

## 2020-04-18 LAB
ALBUMIN SERPL ELPH-MCNC: 3.3 G/DL — SIGNIFICANT CHANGE UP (ref 3.3–5.2)
ALP SERPL-CCNC: 55 U/L — SIGNIFICANT CHANGE UP (ref 40–120)
ALT FLD-CCNC: <5 U/L — SIGNIFICANT CHANGE UP
ANION GAP SERPL CALC-SCNC: 16 MMOL/L — SIGNIFICANT CHANGE UP (ref 5–17)
AST SERPL-CCNC: 27 U/L — SIGNIFICANT CHANGE UP
BILIRUB SERPL-MCNC: 0.6 MG/DL — SIGNIFICANT CHANGE UP (ref 0.4–2)
BUN SERPL-MCNC: 39 MG/DL — HIGH (ref 8–20)
CALCIUM SERPL-MCNC: 8.6 MG/DL — SIGNIFICANT CHANGE UP (ref 8.6–10.2)
CHLORIDE SERPL-SCNC: 96 MMOL/L — LOW (ref 98–107)
CO2 SERPL-SCNC: 28 MMOL/L — SIGNIFICANT CHANGE UP (ref 22–29)
CREAT SERPL-MCNC: 1.96 MG/DL — HIGH (ref 0.5–1.3)
GLUCOSE SERPL-MCNC: 185 MG/DL — HIGH (ref 70–99)
HCT VFR BLD CALC: 39.2 % — SIGNIFICANT CHANGE UP (ref 39–50)
HGB BLD-MCNC: 12.4 G/DL — LOW (ref 13–17)
MCHC RBC-ENTMCNC: 29.7 PG — SIGNIFICANT CHANGE UP (ref 27–34)
MCHC RBC-ENTMCNC: 31.6 GM/DL — LOW (ref 32–36)
MCV RBC AUTO: 94 FL — SIGNIFICANT CHANGE UP (ref 80–100)
NT-PROBNP SERPL-SCNC: 2713 PG/ML — HIGH (ref 0–300)
PLATELET # BLD AUTO: 355 K/UL — SIGNIFICANT CHANGE UP (ref 150–400)
POTASSIUM SERPL-MCNC: 4.1 MMOL/L — SIGNIFICANT CHANGE UP (ref 3.5–5.3)
POTASSIUM SERPL-SCNC: 4.1 MMOL/L — SIGNIFICANT CHANGE UP (ref 3.5–5.3)
PROT SERPL-MCNC: 6.5 G/DL — LOW (ref 6.6–8.7)
RBC # BLD: 4.17 M/UL — LOW (ref 4.2–5.8)
RBC # FLD: 13.3 % — SIGNIFICANT CHANGE UP (ref 10.3–14.5)
SODIUM SERPL-SCNC: 139 MMOL/L — SIGNIFICANT CHANGE UP (ref 135–145)
TROPONIN T SERPL-MCNC: 0.03 NG/ML — SIGNIFICANT CHANGE UP (ref 0–0.06)
WBC # BLD: 6.41 K/UL — SIGNIFICANT CHANGE UP (ref 3.8–10.5)
WBC # FLD AUTO: 6.41 K/UL — SIGNIFICANT CHANGE UP (ref 3.8–10.5)

## 2020-04-18 PROCEDURE — 93010 ELECTROCARDIOGRAM REPORT: CPT

## 2020-04-18 PROCEDURE — 99497 ADVNCD CARE PLAN 30 MIN: CPT

## 2020-04-18 PROCEDURE — 99233 SBSQ HOSP IP/OBS HIGH 50: CPT

## 2020-04-18 RX ORDER — POLYETHYLENE GLYCOL 3350 17 G/17G
17 POWDER, FOR SOLUTION ORAL ONCE
Refills: 0 | Status: DISCONTINUED | OUTPATIENT
Start: 2020-04-18 | End: 2020-04-20

## 2020-04-18 RX ORDER — CHOLECALCIFEROL (VITAMIN D3) 125 MCG
1000 CAPSULE ORAL DAILY
Refills: 0 | Status: DISCONTINUED | OUTPATIENT
Start: 2020-04-18 | End: 2020-04-20

## 2020-04-18 RX ORDER — PANTOPRAZOLE SODIUM 20 MG/1
1 TABLET, DELAYED RELEASE ORAL
Qty: 0 | Refills: 0 | DISCHARGE

## 2020-04-18 RX ORDER — THIAMINE MONONITRATE (VIT B1) 100 MG
200 TABLET ORAL DAILY
Refills: 0 | Status: DISCONTINUED | OUTPATIENT
Start: 2020-04-18 | End: 2020-04-20

## 2020-04-18 RX ORDER — ACETAMINOPHEN 500 MG
1 TABLET ORAL
Qty: 0 | Refills: 0 | DISCHARGE

## 2020-04-18 RX ORDER — ASCORBIC ACID 60 MG
500 TABLET,CHEWABLE ORAL
Refills: 0 | Status: DISCONTINUED | OUTPATIENT
Start: 2020-04-18 | End: 2020-04-20

## 2020-04-18 RX ADMIN — PANTOPRAZOLE SODIUM 40 MILLIGRAM(S): 20 TABLET, DELAYED RELEASE ORAL at 04:29

## 2020-04-18 RX ADMIN — Medication 500 MILLIGRAM(S): at 17:28

## 2020-04-18 RX ADMIN — Medication 400 MILLIGRAM(S): at 17:28

## 2020-04-18 RX ADMIN — CARBIDOPA AND LEVODOPA 1.5 TABLET(S): 25; 100 TABLET ORAL at 05:05

## 2020-04-18 RX ADMIN — Medication 25 MILLIGRAM(S): at 04:29

## 2020-04-18 RX ADMIN — CARBIDOPA AND LEVODOPA 1.5 TABLET(S): 25; 100 TABLET ORAL at 17:28

## 2020-04-18 RX ADMIN — Medication 200 MILLIGRAM(S): at 17:28

## 2020-04-18 RX ADMIN — CARBIDOPA AND LEVODOPA 1.5 TABLET(S): 25; 100 TABLET ORAL at 00:59

## 2020-04-18 RX ADMIN — ENOXAPARIN SODIUM 40 MILLIGRAM(S): 100 INJECTION SUBCUTANEOUS at 17:28

## 2020-04-18 RX ADMIN — GABAPENTIN 300 MILLIGRAM(S): 400 CAPSULE ORAL at 05:09

## 2020-04-18 RX ADMIN — Medication 45 MILLIGRAM(S): at 17:28

## 2020-04-18 RX ADMIN — GABAPENTIN 300 MILLIGRAM(S): 400 CAPSULE ORAL at 17:28

## 2020-04-18 RX ADMIN — Medication 40 MILLIGRAM(S): at 05:09

## 2020-04-18 RX ADMIN — Medication 80 MILLIGRAM(S): at 04:29

## 2020-04-18 RX ADMIN — ENOXAPARIN SODIUM 40 MILLIGRAM(S): 100 INJECTION SUBCUTANEOUS at 05:09

## 2020-04-18 RX ADMIN — DULOXETINE HYDROCHLORIDE 30 MILLIGRAM(S): 30 CAPSULE, DELAYED RELEASE ORAL at 11:30

## 2020-04-18 RX ADMIN — CARBIDOPA AND LEVODOPA 1.5 TABLET(S): 25; 100 TABLET ORAL at 11:30

## 2020-04-18 NOTE — PROGRESS NOTE ADULT - ASSESSMENT
85 year old male with pmh of parkinsons, COPD, htn, rheumatoid arthritis on chronic steroids, chf, gerd coming to hospital after being sent in by  for desaturation , patient poor historian and history provided by family. Symptoms  started 2 days prior. was given azithro and tylenol by pmd ,  was 85% on room air at  and 89% in ED , having cough and weakness and not sleeping much. Denies SOB.    #acute hypoxic respiratory failure  - 2/2 viral pna 2/2 covid 19  - isolation precautions  - monitor vitals  - hydroxychloroquine , monitor QTc - ordered for am   - tylenol for fever  - self prone  - albuterol prn  - iv steroids   - hold further abx   - Vit C/D / Thiamine   - follow up inflammatory markers     #lethargy and confusion bouts - today AAOX 3 , will check UA   - check ct head   - ?related to lack of sleep or advancing of parkinson's vs debility as recently d/c from nursing home one week prior     #acute chf diastolic type  - lvef 65% 12/2019  - lasix   - monitor i and o   - daily weight  - fluid restriction  - metoprolol    #htn  - monitor blood pressure  - metoprolol    #gerd  - ppi    #copd  - symbicort interchange    #maria luisa - likely on CKD - stage 3   - cr 01/2020 1.22   - will change Lasix to PO ,   - monitor Cr in am , will get UA   - if no improvement in renal function till am will consider u/s of kidneys   - avoid nephrotoxic medications     #parkinsons disease  - sinemet  - physical evaluation     #dvt prophylaxis  - lovenox SC   Spoke to son Mr Lisa Little # [phone] who he is his HCP , states his has father's Living will , and as per living will ,   patient does not wants to be DNR/DNI , no feeding tubes , only pain mgmt if needed .   T.O taken from son , Dr Don witnessed . As per son father will need home services prior discharge .    Dispo Home - pending clinical improvement

## 2020-04-18 NOTE — PROGRESS NOTE ADULT - SUBJECTIVE AND OBJECTIVE BOX
Medicine Progress Note    Patient is a 85y old  Male who presents with a chief complaint of acute hypoxic respiratory failure (17 Apr 2020 17:22)      SUBJECTIVE / OVERNIGHT EVENTS: Stable overnight. O2 sats adequate on 1L NC, will attempt to completely wean O2. PT consult for dispo recs. Will repeat BNP s/p lasix dose.       MEDICATIONS  (STANDING):  budesonide 160 MICROgram(s)/formoterol 4.5 MICROgram(s) Inhaler 2 Puff(s) Inhalation two times a day  carbidopa/levodopa  25/100 1.5 Tablet(s) Oral four times a day  DULoxetine 30 milliGRAM(s) Oral daily  enoxaparin Injectable 40 milliGRAM(s) SubCutaneous every 12 hours  furosemide   Injectable 40 milliGRAM(s) IV Push daily  gabapentin 300 milliGRAM(s) Oral two times a day  hydroxychloroquine 400 milliGRAM(s) Oral every 24 hours  hydroxychloroquine   Oral   methylPREDNISolone sodium succinate Injectable 80 milliGRAM(s) IV Push every 12 hours  metoprolol succinate ER 25 milliGRAM(s) Oral daily  pantoprazole    Tablet 40 milliGRAM(s) Oral before breakfast    MEDICATIONS  (PRN):  acetaminophen   Tablet .. 650 milliGRAM(s) Oral every 4 hours PRN Temp greater or equal to 38.5C (101.3F)  ALBUTerol    90 MICROgram(s) HFA Inhaler 2 Puff(s) Inhalation every 4 hours PRN Shortness of Breath and/or Wheezing  benzonatate 100 milliGRAM(s) Oral three times a day PRN Cough  guaifenesin/dextromethorphan  Syrup 10 milliLiter(s) Oral every 4 hours PRN Cough      PHYSICAL EXAM:  Vital Signs Last 24 Hrs  T(C): 36.9 (18 Apr 2020 11:29), Max: 36.9 (18 Apr 2020 11:29)  T(F): 98.4 (18 Apr 2020 11:29), Max: 98.4 (18 Apr 2020 11:29)  HR: 75 (18 Apr 2020 11:29) (62 - 86)  BP: 130/82 (18 Apr 2020 11:29) (107/70 - 138/81)  BP(mean): --  RR: 20 (18 Apr 2020 11:29) (18 - 24)  SpO2: 95% (18 Apr 2020 11:16) (90% - 95%)    CONSTITUTIONAL: NAD, well-developed, well-groomed  RESPIRATORY: Normal respiratory effort;  CARDIOVASCULAR: Regular rate and rhythm, normal S1 and S2,  Peripheral pulses are 2+ bilaterally  ABDOMEN: Nontender to palpation, normoactive bowel sounds, no rebound/guarding; No hepatosplenomegaly  PSYCH: A+O to person, place, and time; affect appropriate  NEUROLOGY: CN 2-12 are intact and symmetric; rigidity & tremor to BUE present     SKIN: No rashes; no palpable lesions    LABS:                        12.4   6.41  )-----------( 355      ( 18 Apr 2020 12:36 )             39.2     04-18    139  |  96<L>  |  39.0<H>  ----------------------------<  185<H>  4.1   |  28.0  |  1.96<H>    Ca    8.6      18 Apr 2020 12:36    TPro  6.5<L>  /  Alb  3.3  /  TBili  0.6  /  DBili  x   /  AST  27  /  ALT  <5  /  AlkPhos  55  04-18      CARDIAC MARKERS ( 18 Apr 2020 00:05 )  x     / 0.03 ng/mL / x     / x     / x          COVID-19 PCR: Detected (17 Apr 2020 14:27)

## 2020-04-18 NOTE — PROGRESS NOTE ADULT - SUBJECTIVE AND OBJECTIVE BOX
Patient seen and examined . Comfortable , on O2 via NC , had BM yesterday     CC : cough/ weakness - better     HPI:  85 year old male with pmh of parkinsons, COPD, htn, rheumatoid arthritis on chronic steroids, chf, gerd coming to hospital after being sent in by  for desaturation. patient poor historian and history provided by family. states started 2 days prior. was given azithro and tylenol by pmd yesterday. was 85% on room air at  and 89% in ED ,  having cough and weakness and not sleeping much. Denies SOB. discharged from nursing home one week prior (17 Apr 2020 17:22)      PAST MEDICAL & SURGICAL HISTORY:  Diastolic CHF  Neuropathy  Parkinsons disease  No significant past surgical history      MEDICATIONS  (STANDING):  budesonide 160 MICROgram(s)/formoterol 4.5 MICROgram(s) Inhaler 2 Puff(s) Inhalation two times a day  carbidopa/levodopa  25/100 1.5 Tablet(s) Oral four times a day  DULoxetine 30 milliGRAM(s) Oral daily  enoxaparin Injectable 40 milliGRAM(s) SubCutaneous every 12 hours  furosemide   Injectable 40 milliGRAM(s) IV Push daily  gabapentin 300 milliGRAM(s) Oral two times a day  hydroxychloroquine 400 milliGRAM(s) Oral every 24 hours  hydroxychloroquine   Oral   methylPREDNISolone sodium succinate Injectable 80 milliGRAM(s) IV Push every 12 hours  metoprolol succinate ER 25 milliGRAM(s) Oral daily  pantoprazole    Tablet 40 milliGRAM(s) Oral before breakfast    MEDICATIONS  (PRN):  acetaminophen   Tablet .. 650 milliGRAM(s) Oral every 4 hours PRN Temp greater or equal to 38.5C (101.3F)  ALBUTerol    90 MICROgram(s) HFA Inhaler 2 Puff(s) Inhalation every 4 hours PRN Shortness of Breath and/or Wheezing  benzonatate 100 milliGRAM(s) Oral three times a day PRN Cough  guaifenesin/dextromethorphan  Syrup 10 milliLiter(s) Oral every 4 hours PRN Cough      LABS:                          12.4   6.41  )-----------( 355      ( 18 Apr 2020 12:36 )             39.2     04-18    139  |  96<L>  |  39.0<H>  ----------------------------<  185<H>  4.1   |  28.0  |  1.96<H>  Troponin T, Serum (04.18.20 @ 00:05)    Troponin T, Serum: 0.03: Reference Interval for Troponin T  Less than 0.06 ng/mL - includes the 99th percentile of a healthy  population at a method C.V. of 10% or less.  NOTE: Troponin T is measured by the Roche CLIA method and these  results are not interchangable with Troponin I results since they are  different assays with different reference intervals. ng/mL      Ca    8.6      18 Apr 2020 12:36    TPro  6.5<L>  /  Alb  3.3  /  TBili  0.6  /  DBili  x   /  AST  27  /  ALT  <5  /  AlkPhos  55  04-18    Serum Pro-Brain Natriuretic Peptide (04.18.20 @ 00:05)    Serum Pro-Brain Natriuretic Peptide: 2713: NT-proBNP Interpretive comments:  Acute Congestive Heart Failure is unlikely if NT-proBNP is less than 300  pg/mL, for any age.  Consider Acute Congestive Heart Failure if:  AGE               NT-proBNP Result  ___               ________________  < 50year           > 450 pg/mL  50 - 75 years     > 900 pg/mL  > 75 years         > 1800 pg/ml  All results require clinical correlation. Consider obtaining a baseline or  "dry" NT-proBNP level when the patient is stabilized, so that subsequent  levels can be related to that. Patients with recurrent CHF may have  elevated  NT-proBNP levels. Acute failure episodes generally produce levels at  least 25  % greater than base line levels. The above values are derived from a large  multi-center international study, "CHRISTI Combs, et al, European Heart  Journal,  2006; 27:330-337. pg/mL    COVID-19 PCR . (04.17.20 @ 14:27)    COVID-19 PCR: Detected: TYPE:(C=Critical, N=Notification, A=Abnormal) c  TESTS: _covid  DATE/TIME CALLED: _04/17/20 15:46  CALLED TO: benjie stoddard rn  READ BACK (2 Patient Identifiers)(Y/N): _y  READ BACK VALUES (Y/N): _y  CALLED BY: vanessa root  This test has been validated by [facility] to be accurate;  though it has not been FDA cleared/approved by the usual pathway  As with all laboratory test, results should be correlated with clinical  findings.  https://www.fda.gov/media/679757/download  https://www.fda.gov/media/487269/download      RADIOLOGY & ADDITIONAL TESTS:    < from: CT Head No Cont (04.17.20 @ 18:42) >   EXAM:  CT BRAIN                          PROCEDURE DATE:  04/17/2020          INTERPRETATION:  Head CT without contrast   COMPARISON: 12/1/2019.  CLINICAL INFORMATION: .  TECHNIQUE: Contiguous axial 2.5 mm slice thickness images of the head were obtained without the use of intravenous contrast media.  This scan was performed using automatic exposure control (radiation dose reduction software) to obtain a diagnostic image quality scan with patient dose as low as reasonably achievable.     FINDINGS:  Vascular calcification seen within carotid siphon vessels.   Moderate cerebral volume loss is present with secondary proportional prominence of the sulci and ventricles.    The posterior fossa structures and basal cisterns appear normal.    There is no intracranial hemorrhage.     There is no intracranial mass or midline shift.    There is no sulcal effacement to suggest acute stroke.    There are scattered white matter hypodensities consistent with small vessel disease.    The basal gangliaand thalami appear normal in morphology and attenuation.    The visualized portions of the optic globes and paranasal sinuses are normal.    There are no skull fractures.    IMPRESSION:  No interval change  No acute intracranial findings.    Moderateatrophy and chronic white matter microvascular ischemic changes as described.   If acute stroke is of clinical concern, MRI with diffusion-weighted images would be helpful for further characterization.      DORIS NEUMANN M.D., ATTENDING RADIOLOGIST  This document has been electronically signed. Apr 17 2020  6:56PM    < end of copied text >    < from: US Duplex Venous Lower Ext Complete, Bilateral (04.17.20 @ 18:27) >     EXAM:  US DPLX LWR EXT VEINS COMPL BI                          PROCEDURE DATE:  04/17/2020          INTERPRETATION:  CLINICAL INFORMATION: Bilateral leg swelling, Confirmed COVID-19 positive.    COMPARISON: Venous duplex 1/24/2020    TECHNIQUE: Duplex sonography of the BILATERAL LOWER extremity veins with color and spectral Doppler, with and without compression.      FINDINGS:    There is normal compressibility of the bilateral common femoral, femoral and popliteal veins.     Doppler examination shows normal spontaneous and phasic flow.    No calf vein thrombosis is detected.    IMPRESSION:     No evidence of deep venous thrombosis in either lower extremity.      KARINA WIGGINS M.D., ATTENDING RADIOLOGIST  This document has been electronically signed. Apr 17 2020  6:50PM    < end of copied text >    < from: Xray Chest 1 View-PORTABLE IMMEDIATE (04.17.20 @ 14:27) >     EXAM:  XR CHEST PORTABLE IMMED 1V                          PROCEDURE DATE:  04/17/2020          INTERPRETATION:  Clinical history: Cough and fever    Patchy infiltrates seen in the right midlung laterally. No obvious infiltrates on the left. No pleural effusions. Heart size is stable.    Impression: Suspicion for right-sided pneumonia which may include viral pneumonia      STEF HORN   This document has been electronically signed. Apr 17 2020  2:31PM    < end of copied text >        < from: 12 Lead ECG (04.17.20 @ 14:47) >    Ventricular Rate 82 BPM    Atrial Rate 82 BPM    QRS Duration 104 ms    Q-T Interval 418 ms    QTC Calculation(Bezet) 488 ms    R Axis -50 degrees    T Axis 37 degrees    Diagnosis Line *** Poor data quality, interpretation may be adversely affected  Atrial fibrillation  Low voltage QRS  Left anterior fascicular block  Cannot rule out Inferior infarct (masked by fascicular block?) , age undetermined  Abnormal ECG    Confirmed by Valentin Sandoval ([id number]) on 4/17/2020 6:55:40 PM    < end of copied text >              REVIEW OF SYSTEMS:    As above , all other systems are reviewed and are negative     Vital Signs Last 24 Hrs  T(C): 37.6 (18 Apr 2020 16:18), Max: 37.6 (18 Apr 2020 16:18)  T(F): 99.7 (18 Apr 2020 16:18), Max: 99.7 (18 Apr 2020 16:18)  HR: 84 (18 Apr 2020 16:18) (62 - 84)  BP: 120/63 (18 Apr 2020 16:18) (120/63 - 138/81)  BP(mean): --  RR: 19 (18 Apr 2020 16:18) (18 - 20)  SpO2: 93% (18 Apr 2020 16:18) (90% - 95%)    PHYSICAL EXAM:    GENERAL: NAD, well-groomed, well-developed  HEAD:  Atraumatic, Normocephalic  EYES: EOMI, PERRLA, conjunctiva and sclera clear  NECK: Supple, No JVD, Normal thyroid  NERVOUS SYSTEM:  Alert & Oriented X3, no focal deficit  CHEST/LUNG: CTA b/l ,  no  rales, rhonchi, wheezing, or rubs  HEART: Regular rate and rhythm; No murmurs, rubs, or gallops  ABDOMEN: Soft, Nontender, Nondistended; Bowel sounds present  EXTREMITIES:  2+ Peripheral Pulses, No clubbing, cyanosis, or edema  LYMPH: No lymphadenopathy noted  SKIN: No rashes or lesions

## 2020-04-18 NOTE — PROGRESS NOTE ADULT - ASSESSMENT
1. Acute hypoxic respiratory failure 2/2 viral PNA 2/2 covid 19.  Patchy infiltrates seen in the right midlung on admission. COVID19 positive. Supplemental O2 weaned from 2-->1LNC this AM.     - Continue isolation precautions  - Continue hydroxychloroquine & steroids  - Albuterol PRN  -   - Tylenol for fever/pain  - IS Q1H while awake  - Wean O2 as tolerated       2. Encephalopathy  Improved -Likely due to infection/hypoxia PTA. Possible contributing factor underlying Parkinson's. Head CT negative for acute process. Alert and oriented to general situation on exam today.    -Treating infection/fevers as above  -Encourage sleep hygiene     3. HFpEF   LVEF 65% 12/2019, BNP elevated >2K on admission. Trop normal. Given 40 IV Lasix on admission. Takes Lasix 20mg & metoprolol at home.     - Additional dose IV Lasix   - Monitor I&O Q4H   - Daily weight  - Cardiac diet w/ 1L fluid restriction  - Continue metoprolol  - Repeat BNP & BMP in AM    4. HTN  Chronic. Normotensive today.    - monitor blood pressure  - metoprolol    5. GERD    -Continue PPI    6. COPD    -Continue Symbicort     7. ASIF on CKD3  Cr elevated at 2 on admission. Baseline 1.22 on 01/2020. Possible contributing factor include  COVID infection, renal congestion 2/2 acute CHF and/or chronic diuretic use. Cr remains elevated at 1.9 this AM     - BMP in AM  - Consider renal consult and u/s if not improving   - Avoid nephrotoxic medications   -Renally dose medications as indicated    8. Parkinson's disease    - Continue home Sinemet  - PT eval & treat    DVT prophy  - Lovenox SC

## 2020-04-19 LAB
ANION GAP SERPL CALC-SCNC: 19 MMOL/L — HIGH (ref 5–17)
BUN SERPL-MCNC: 48 MG/DL — HIGH (ref 8–20)
CALCIUM SERPL-MCNC: 8.3 MG/DL — LOW (ref 8.6–10.2)
CHLORIDE SERPL-SCNC: 100 MMOL/L — SIGNIFICANT CHANGE UP (ref 98–107)
CO2 SERPL-SCNC: 28 MMOL/L — SIGNIFICANT CHANGE UP (ref 22–29)
CREAT SERPL-MCNC: 2.01 MG/DL — HIGH (ref 0.5–1.3)
CRP SERPL-MCNC: 16.22 MG/DL — HIGH (ref 0–0.4)
D DIMER BLD IA.RAPID-MCNC: 251 NG/ML DDU — HIGH
FERRITIN SERPL-MCNC: 453 NG/ML — HIGH (ref 30–400)
GLUCOSE SERPL-MCNC: 142 MG/DL — HIGH (ref 70–99)
HCT VFR BLD CALC: 38.7 % — LOW (ref 39–50)
HGB BLD-MCNC: 12 G/DL — LOW (ref 13–17)
LDH SERPL L TO P-CCNC: 280 U/L — HIGH (ref 98–192)
MCHC RBC-ENTMCNC: 29.5 PG — SIGNIFICANT CHANGE UP (ref 27–34)
MCHC RBC-ENTMCNC: 31 GM/DL — LOW (ref 32–36)
MCV RBC AUTO: 95.1 FL — SIGNIFICANT CHANGE UP (ref 80–100)
NT-PROBNP SERPL-SCNC: 1342 PG/ML — HIGH (ref 0–300)
PLATELET # BLD AUTO: 369 K/UL — SIGNIFICANT CHANGE UP (ref 150–400)
POTASSIUM SERPL-MCNC: 4 MMOL/L — SIGNIFICANT CHANGE UP (ref 3.5–5.3)
POTASSIUM SERPL-SCNC: 4 MMOL/L — SIGNIFICANT CHANGE UP (ref 3.5–5.3)
PROCALCITONIN SERPL-MCNC: 0.76 NG/ML — HIGH (ref 0.02–0.1)
RBC # BLD: 4.07 M/UL — LOW (ref 4.2–5.8)
RBC # FLD: 13.2 % — SIGNIFICANT CHANGE UP (ref 10.3–14.5)
SODIUM SERPL-SCNC: 147 MMOL/L — HIGH (ref 135–145)
WBC # BLD: 7.63 K/UL — SIGNIFICANT CHANGE UP (ref 3.8–10.5)
WBC # FLD AUTO: 7.63 K/UL — SIGNIFICANT CHANGE UP (ref 3.8–10.5)

## 2020-04-19 PROCEDURE — 99497 ADVNCD CARE PLAN 30 MIN: CPT | Mod: CS

## 2020-04-19 PROCEDURE — 93010 ELECTROCARDIOGRAM REPORT: CPT

## 2020-04-19 PROCEDURE — 99233 SBSQ HOSP IP/OBS HIGH 50: CPT

## 2020-04-19 RX ADMIN — Medication 500 MILLIGRAM(S): at 17:03

## 2020-04-19 RX ADMIN — CARBIDOPA AND LEVODOPA 1.5 TABLET(S): 25; 100 TABLET ORAL at 00:12

## 2020-04-19 RX ADMIN — BUDESONIDE AND FORMOTEROL FUMARATE DIHYDRATE 2 PUFF(S): 160; 4.5 AEROSOL RESPIRATORY (INHALATION) at 11:38

## 2020-04-19 RX ADMIN — ENOXAPARIN SODIUM 40 MILLIGRAM(S): 100 INJECTION SUBCUTANEOUS at 17:03

## 2020-04-19 RX ADMIN — Medication 25 MILLIGRAM(S): at 05:18

## 2020-04-19 RX ADMIN — PANTOPRAZOLE SODIUM 40 MILLIGRAM(S): 20 TABLET, DELAYED RELEASE ORAL at 05:25

## 2020-04-19 RX ADMIN — ENOXAPARIN SODIUM 40 MILLIGRAM(S): 100 INJECTION SUBCUTANEOUS at 05:17

## 2020-04-19 RX ADMIN — DULOXETINE HYDROCHLORIDE 30 MILLIGRAM(S): 30 CAPSULE, DELAYED RELEASE ORAL at 11:40

## 2020-04-19 RX ADMIN — CARBIDOPA AND LEVODOPA 1.5 TABLET(S): 25; 100 TABLET ORAL at 11:40

## 2020-04-19 RX ADMIN — Medication 45 MILLIGRAM(S): at 05:22

## 2020-04-19 RX ADMIN — GABAPENTIN 300 MILLIGRAM(S): 400 CAPSULE ORAL at 17:04

## 2020-04-19 RX ADMIN — Medication 400 MILLIGRAM(S): at 17:04

## 2020-04-19 RX ADMIN — GABAPENTIN 300 MILLIGRAM(S): 400 CAPSULE ORAL at 05:18

## 2020-04-19 RX ADMIN — CARBIDOPA AND LEVODOPA 1.5 TABLET(S): 25; 100 TABLET ORAL at 05:19

## 2020-04-19 RX ADMIN — CARBIDOPA AND LEVODOPA 1.5 TABLET(S): 25; 100 TABLET ORAL at 17:03

## 2020-04-19 RX ADMIN — Medication 1000 UNIT(S): at 11:39

## 2020-04-19 RX ADMIN — Medication 200 MILLIGRAM(S): at 11:39

## 2020-04-19 RX ADMIN — Medication 45 MILLIGRAM(S): at 17:04

## 2020-04-19 RX ADMIN — BUDESONIDE AND FORMOTEROL FUMARATE DIHYDRATE 2 PUFF(S): 160; 4.5 AEROSOL RESPIRATORY (INHALATION) at 20:00

## 2020-04-19 RX ADMIN — Medication 500 MILLIGRAM(S): at 05:21

## 2020-04-19 NOTE — PROGRESS NOTE ADULT - ATTENDING COMMENTS
Patient seen and examined ,m AAOX 2- 3 , has capacity to make decision ,   so wants to be full CODE , DNR/I order d/abbie .   Son agree with father's decision .   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 RBBB , AFib , QTc - 488ms , will repeat STAT EKG   d/w patient / PA / son

## 2020-04-19 NOTE — PROGRESS NOTE ADULT - ASSESSMENT
1. Acute hypoxic respiratory failure 2/2 viral PNA 2/2 COVID19.  Patchy infiltrates seen in the right midlung on admission. COVID19 positive. Remains on supplemental O2 2LNC this AM. Afebrile overnight.     - Continue isolation precautions  - Continue hydroxychloroquine & steroids  - Continue Vit C/ Vit D/ Thiamine  - Albuterol PRN  -   - Tylenol for fever/pain  - IS Q1H while awake  - Wean O2 as tolerated       2. Encephalopathy  Improved -Likely due to infection/hypoxia PTA. Possible contributing factor underlying Parkinson's. Head CT negative for acute process. Alert and oriented to self, location, & general situation on exam today.    -Treating infection/fevers as above  -Encourage sleep hygiene     3. HFpEF   LVEF 65% 12/2019, BNP elevated >2K on admission. Trop normal. Given 40 IV Lasix on admission. Takes Lasix 20mg & metoprolol at home. BNP down this AM.     - Holding further diuresis at this time  - Monitor I&O Q4H   - Daily weight  - Cardiac diet   - Continue metoprolol  - Repeat BMP in AM    4. HTN  Chronic. Normotensive today.    - monitor blood pressure  - metoprolol with hold parameters    5. GERD    -Continue PPI    6. COPD    -Continue Symbicort     7. ASIF on CKD3  Cr elevated at 2 on admission. Baseline 1.22 on 01/2020. Possible contributing factor include COVID infection, renal congestion 2/2 acute CHF and/or chronic diuretic use. Cr remains elevated at 2 this AM. Will hold further diuresis at this time.     - BMP in AM  - Consider renal consult and u/s if not improving   - Avoid nephrotoxic medications   - Renally dose medications as indicated    8. Parkinson's disease  Chronic. With tremor and rigidity on exam. Various small abrasions to extremities. He is high fall risk & with recent SNF stay.     - Continue home Sinemet, Duloxetine & gabapentin   - PT eval & treat - Home vs SNF at d/c  - Fall precautions     DVT prophy  - Lovenox SC 1. Acute hypoxic respiratory failure 2/2 viral PNA 2/2 COVID19.  Patchy infiltrates seen in the right midlung on admission. COVID19 positive. Remains on supplemental O2 2LNC this AM. Afebrile overnight.     - Continue isolation precautions  - Continue hydroxychloroquine & steroids  - Continue Vit C/ Vit D/ Thiamine  - Albuterol PRN  -   - Tylenol for fever/pain  - IS Q1H while awake  - Wean O2 as tolerated       2. Encephalopathy  Improved -Likely due to infection/hypoxia PTA. Possible contributing factor underlying Parkinson's. Head CT negative for acute process. Alert and oriented to self, location, & general situation on exam today.    -Encourage sleep hygiene     3. HFpEF   LVEF 65% 12/2019, BNP elevated >2K on admission. Trop normal. Given 40 IV Lasix on admission. Takes Lasix 20mg & metoprolol at home. BNP down this AM.     - Holding further diuresis at this time  - Monitor I&O Q4H   - Daily weight  - Cardiac diet   - Continue metoprolol  - Repeat BMP in AM    4. HTN  Chronic. Normotensive today.    - monitor blood pressure  - metoprolol with hold parameters    5. GERD    -Continue PPI    6. COPD    -Continue Symbicort     7. ASIF on CKD3  Cr elevated at 2 on admission. Baseline 1.22 on 01/2020. Possible contributing factor include COVID infection, renal congestion 2/2 acute CHF and/or chronic diuretic use. Cr remains elevated at 2 this AM. Will hold further diuresis at this time.     - BMP in AM  - Consider renal consult and u/s if not improving   - Avoid nephrotoxic medications   - Renally dose medications as indicated    8. Parkinson's disease  Chronic. With tremor and rigidity on exam. Various small abrasions to extremities. He is high fall risk & with recent SNF stay.     - Continue home Sinemet, Duloxetine & gabapentin   - PT eval & treat - Home vs SNF at d/c  - Fall precautions     DVT prophy  - Lovenox SC     Chronic use of steroids - as per son due to rheumatoid arthritis , will d/c iv steroids and continue home dose in 2 days   patient takes 10 mg BID     Dispo plan is once clinically improve - renal function improve ,  pending physical therapy eval , likely in 1- 2 days

## 2020-04-19 NOTE — GOALS OF CARE CONVERSATION - ADVANCED CARE PLANNING - CONVERSATION DETAILS
Spoke to patient son Mr Lisa Little over the phone , he is patient's HCP ,   he Living will - patient wants to be DNR/DNI , no tube feeding or IV abx except   pain medications to relive pain .   Dr Arian ALBRIGHT and Dr Ligia Crow witnessed T.O .
Patient's son e - mailed me Living will where patient stated he appoints his son and daughter as HCP   to make decision when he has no capacity .  Patient re evaluated today , alert, oriented to self , year , can't recall where he is presently   but knows he is not at home , aware about his children    Asked what he wants us to do if his heart stop and breathing stop at this time , aware that   covid infection can progress and he may need to be intubated .   Understand risk and benefits , stated wants to be intubated and resuscitation .  Spoke to son Mr Little ,explained father's wishes and wants to go by father's wishes . m  So will d/c DNR/DNI order .  Patient is full CODE .

## 2020-04-19 NOTE — PROGRESS NOTE ADULT - SUBJECTIVE AND OBJECTIVE BOX
Medicine Progress Note    Patient is a 85y old  Male who presents with a chief complaint of acute hypoxic respiratory failure (18 Apr 2020 16:44)      SUBJECTIVE / OVERNIGHT EVENTS: No acute events overnight. He has decision making capacity and would like to be full code if his condition deteriorated.     Stable respiratory status, remains on 2LNC. Cr remains elevated at 2 this AM. Will hold off on further diuresis at this time. BNP down overnight.       MEDICATIONS  (STANDING):  ascorbic acid 500 milliGRAM(s) Oral two times a day  budesonide 160 MICROgram(s)/formoterol 4.5 MICROgram(s) Inhaler 2 Puff(s) Inhalation two times a day  carbidopa/levodopa  25/100 1.5 Tablet(s) Oral four times a day  cholecalciferol 1000 Unit(s) Oral daily  DULoxetine 30 milliGRAM(s) Oral daily  enoxaparin Injectable 40 milliGRAM(s) SubCutaneous every 12 hours  gabapentin 300 milliGRAM(s) Oral two times a day  hydroxychloroquine 400 milliGRAM(s) Oral every 24 hours  hydroxychloroquine   Oral   methylPREDNISolone sodium succinate Injectable 45 milliGRAM(s) IV Push every 12 hours  metoprolol succinate ER 25 milliGRAM(s) Oral daily  pantoprazole    Tablet 40 milliGRAM(s) Oral before breakfast  polyethylene glycol 3350 17 Gram(s) Oral once  thiamine 200 milliGRAM(s) Oral daily    MEDICATIONS  (PRN):  acetaminophen   Tablet .. 650 milliGRAM(s) Oral every 4 hours PRN Temp greater or equal to 38.5C (101.3F)  ALBUTerol    90 MICROgram(s) HFA Inhaler 2 Puff(s) Inhalation every 4 hours PRN Shortness of Breath and/or Wheezing  benzonatate 100 milliGRAM(s) Oral three times a day PRN Cough  guaifenesin/dextromethorphan  Syrup 10 milliLiter(s) Oral every 4 hours PRN Cough        PHYSICAL EXAM:  Vital Signs Last 24 Hrs  T(C): 36.9 (19 Apr 2020 09:27), Max: 37.6 (18 Apr 2020 16:18)  T(F): 98.4 (19 Apr 2020 09:27), Max: 99.7 (18 Apr 2020 16:18)  HR: 79 (19 Apr 2020 09:27) (75 - 84)  BP: 128/72 (19 Apr 2020 09:27) (120/63 - 138/76)  BP(mean): --  RR: 22 (19 Apr 2020 09:27) (19 - 22)  SpO2: 91% (19 Apr 2020 09:27) (91% - 95%)    CONSTITUTIONAL: NAD, well-developed, well-groomed  ENMT: Moist oral mucosa, no pharyngeal injection or exudates; dentures present  RESPIRATORY: Normal respiratory effort  CARDIOVASCULAR: Regular rate and rhythm, normal S1 and S2,  No lower extremity edema; Peripheral pulses are 2+ bilaterally  ABDOMEN: Nontender to palpation, normoactive bowel sounds, no rebound/guarding; No hepatosplenomegaly  PSYCH: A+O to person, place, and time; affect appropriate  NEUROLOGY: CN 2-12 are intact and symmetric; no gross sensory deficits. Generalized weakness & BUE tremor observed.     SKIN: No rashes; no palpable lesions    LABS:                        12.0   7.63  )-----------( 369      ( 19 Apr 2020 06:07 )             38.7     04-19    147<H>  |  100  |  48.0<H>  ----------------------------<  142<H>  4.0   |  28.0  |  2.01<H>    Ca    8.3<L>      19 Apr 2020 06:07    TPro  6.5<L>  /  Alb  3.3  /  TBili  0.6  /  DBili  x   /  AST  27  /  ALT  <5  /  AlkPhos  55  04-18      CARDIAC MARKERS ( 18 Apr 2020 00:05 )  x     / 0.03 ng/mL / x     / x     / x          COVID-19 PCR: Detected (17 Apr 2020 14:27)

## 2020-04-20 ENCOUNTER — TRANSCRIPTION ENCOUNTER (OUTPATIENT)
Age: 85
End: 2020-04-20

## 2020-04-20 VITALS
SYSTOLIC BLOOD PRESSURE: 140 MMHG | DIASTOLIC BLOOD PRESSURE: 81 MMHG | TEMPERATURE: 99 F | RESPIRATION RATE: 20 BRPM | OXYGEN SATURATION: 92 % | HEART RATE: 83 BPM

## 2020-04-20 LAB
ANION GAP SERPL CALC-SCNC: 14 MMOL/L — SIGNIFICANT CHANGE UP (ref 5–17)
APPEARANCE UR: CLEAR — SIGNIFICANT CHANGE UP
BACTERIA # UR AUTO: NEGATIVE — SIGNIFICANT CHANGE UP
BILIRUB UR-MCNC: NEGATIVE — SIGNIFICANT CHANGE UP
BUN SERPL-MCNC: 51 MG/DL — HIGH (ref 8–20)
CALCIUM SERPL-MCNC: 8.8 MG/DL — SIGNIFICANT CHANGE UP (ref 8.6–10.2)
CHLORIDE SERPL-SCNC: 103 MMOL/L — SIGNIFICANT CHANGE UP (ref 98–107)
CO2 SERPL-SCNC: 29 MMOL/L — SIGNIFICANT CHANGE UP (ref 22–29)
COLOR SPEC: YELLOW — SIGNIFICANT CHANGE UP
CREAT SERPL-MCNC: 1.76 MG/DL — HIGH (ref 0.5–1.3)
DIFF PNL FLD: NEGATIVE — SIGNIFICANT CHANGE UP
EPI CELLS # UR: NEGATIVE — SIGNIFICANT CHANGE UP
GLUCOSE SERPL-MCNC: 146 MG/DL — HIGH (ref 70–99)
GLUCOSE UR QL: NEGATIVE MG/DL — SIGNIFICANT CHANGE UP
KETONES UR-MCNC: NEGATIVE — SIGNIFICANT CHANGE UP
LEUKOCYTE ESTERASE UR-ACNC: NEGATIVE — SIGNIFICANT CHANGE UP
NITRITE UR-MCNC: NEGATIVE — SIGNIFICANT CHANGE UP
PH UR: 5 — SIGNIFICANT CHANGE UP (ref 5–8)
POTASSIUM SERPL-MCNC: 4.3 MMOL/L — SIGNIFICANT CHANGE UP (ref 3.5–5.3)
POTASSIUM SERPL-SCNC: 4.3 MMOL/L — SIGNIFICANT CHANGE UP (ref 3.5–5.3)
PROT UR-MCNC: 30 MG/DL
RBC CASTS # UR COMP ASSIST: SIGNIFICANT CHANGE UP /HPF (ref 0–4)
SODIUM SERPL-SCNC: 146 MMOL/L — HIGH (ref 135–145)
SP GR SPEC: 1.02 — SIGNIFICANT CHANGE UP (ref 1.01–1.02)
UROBILINOGEN FLD QL: NEGATIVE MG/DL — SIGNIFICANT CHANGE UP
WBC UR QL: SIGNIFICANT CHANGE UP

## 2020-04-20 PROCEDURE — 99239 HOSP IP/OBS DSCHRG MGMT >30: CPT

## 2020-04-20 RX ORDER — GUAIFENESIN/DEXTROMETHORPHAN 600MG-30MG
10 TABLET, EXTENDED RELEASE 12 HR ORAL
Qty: 0 | Refills: 0 | DISCHARGE
Start: 2020-04-20

## 2020-04-20 RX ORDER — BUDESONIDE AND FORMOTEROL FUMARATE DIHYDRATE 160; 4.5 UG/1; UG/1
2 AEROSOL RESPIRATORY (INHALATION)
Qty: 0 | Refills: 0 | DISCHARGE
Start: 2020-04-20

## 2020-04-20 RX ORDER — CHOLECALCIFEROL (VITAMIN D3) 125 MCG
1000 CAPSULE ORAL
Qty: 0 | Refills: 0 | DISCHARGE
Start: 2020-04-20

## 2020-04-20 RX ORDER — HYDROXYCHLOROQUINE SULFATE 200 MG
2 TABLET ORAL
Qty: 0 | Refills: 0 | DISCHARGE
Start: 2020-04-20

## 2020-04-20 RX ORDER — ALBUTEROL 90 UG/1
2 AEROSOL, METERED ORAL
Qty: 0 | Refills: 0 | DISCHARGE
Start: 2020-04-20

## 2020-04-20 RX ORDER — FUROSEMIDE 40 MG
1 TABLET ORAL
Qty: 0 | Refills: 0 | DISCHARGE

## 2020-04-20 RX ORDER — BUDESONIDE AND FORMOTEROL FUMARATE DIHYDRATE 160; 4.5 UG/1; UG/1
2 AEROSOL RESPIRATORY (INHALATION)
Qty: 0 | Refills: 0 | DISCHARGE

## 2020-04-20 RX ORDER — ASCORBIC ACID 60 MG
1 TABLET,CHEWABLE ORAL
Qty: 0 | Refills: 0 | DISCHARGE
Start: 2020-04-20

## 2020-04-20 RX ORDER — THIAMINE MONONITRATE (VIT B1) 100 MG
2 TABLET ORAL
Qty: 0 | Refills: 0 | DISCHARGE
Start: 2020-04-20

## 2020-04-20 RX ADMIN — CARBIDOPA AND LEVODOPA 1.5 TABLET(S): 25; 100 TABLET ORAL at 12:03

## 2020-04-20 RX ADMIN — Medication 200 MILLIGRAM(S): at 16:49

## 2020-04-20 RX ADMIN — CARBIDOPA AND LEVODOPA 1.5 TABLET(S): 25; 100 TABLET ORAL at 00:13

## 2020-04-20 RX ADMIN — Medication 45 MILLIGRAM(S): at 06:23

## 2020-04-20 RX ADMIN — Medication 45 MILLIGRAM(S): at 16:50

## 2020-04-20 RX ADMIN — GABAPENTIN 300 MILLIGRAM(S): 400 CAPSULE ORAL at 16:50

## 2020-04-20 RX ADMIN — Medication 500 MILLIGRAM(S): at 16:50

## 2020-04-20 RX ADMIN — BUDESONIDE AND FORMOTEROL FUMARATE DIHYDRATE 2 PUFF(S): 160; 4.5 AEROSOL RESPIRATORY (INHALATION) at 09:21

## 2020-04-20 RX ADMIN — ENOXAPARIN SODIUM 40 MILLIGRAM(S): 100 INJECTION SUBCUTANEOUS at 16:50

## 2020-04-20 RX ADMIN — PANTOPRAZOLE SODIUM 40 MILLIGRAM(S): 20 TABLET, DELAYED RELEASE ORAL at 06:22

## 2020-04-20 RX ADMIN — DULOXETINE HYDROCHLORIDE 30 MILLIGRAM(S): 30 CAPSULE, DELAYED RELEASE ORAL at 12:03

## 2020-04-20 RX ADMIN — ENOXAPARIN SODIUM 40 MILLIGRAM(S): 100 INJECTION SUBCUTANEOUS at 06:21

## 2020-04-20 RX ADMIN — Medication 1000 UNIT(S): at 12:03

## 2020-04-20 RX ADMIN — Medication 400 MILLIGRAM(S): at 16:50

## 2020-04-20 RX ADMIN — Medication 25 MILLIGRAM(S): at 06:22

## 2020-04-20 RX ADMIN — CARBIDOPA AND LEVODOPA 1.5 TABLET(S): 25; 100 TABLET ORAL at 16:50

## 2020-04-20 RX ADMIN — CARBIDOPA AND LEVODOPA 1.5 TABLET(S): 25; 100 TABLET ORAL at 06:22

## 2020-04-20 RX ADMIN — GABAPENTIN 300 MILLIGRAM(S): 400 CAPSULE ORAL at 06:22

## 2020-04-20 RX ADMIN — BUDESONIDE AND FORMOTEROL FUMARATE DIHYDRATE 2 PUFF(S): 160; 4.5 AEROSOL RESPIRATORY (INHALATION) at 16:51

## 2020-04-20 RX ADMIN — Medication 500 MILLIGRAM(S): at 06:22

## 2020-04-20 NOTE — DISCHARGE NOTE NURSING/CASE MANAGEMENT/SOCIAL WORK - PATIENT PORTAL LINK FT
You can access the FollowMyHealth Patient Portal offered by [facility] by registering at the following website: [email or web address]. By joining [employer]’s FollowMyHealth portal, you will also be able to view your health information using other applications (apps) compatible with our system.

## 2020-04-20 NOTE — PROGRESS NOTE ADULT - SUBJECTIVE AND OBJECTIVE BOX
Patient seen and examined . Comfortable , no complaints , remains on O2 2 Lit , AAOX3 , no complaints .     CC : cough and weakness - improved , hypoxia     HPI:  85 year old male with pmh of parkinsons, COPD, htn, rheumatoid arthritis on chronic steroids, chf, gerd coming to hospital after being sent in by  for desaturation. patient poor historian and history provided by family. states started 2 days prior. was given azithro and tylenol by pmd yesterday. was 85% on room air at  and 89% in ED. patient staing having cough and weakness and not sleeping much. Denies SOB. discharged from nursing home one week prior (2020 17:22)      PAST MEDICAL & SURGICAL HISTORY:  Diastolic CHF  Neuropathy  Parkinsons disease  No significant past surgical history      MEDICATIONS  (STANDING):  ascorbic acid 500 milliGRAM(s) Oral two times a day  budesonide 160 MICROgram(s)/formoterol 4.5 MICROgram(s) Inhaler 2 Puff(s) Inhalation two times a day  carbidopa/levodopa  25/100 1.5 Tablet(s) Oral four times a day  cholecalciferol 1000 Unit(s) Oral daily  DULoxetine 30 milliGRAM(s) Oral daily  enoxaparin Injectable 40 milliGRAM(s) SubCutaneous every 12 hours  gabapentin 300 milliGRAM(s) Oral two times a day  hydroxychloroquine 400 milliGRAM(s) Oral every 24 hours  hydroxychloroquine   Oral   methylPREDNISolone sodium succinate Injectable 45 milliGRAM(s) IV Push every 12 hours  metoprolol succinate ER 25 milliGRAM(s) Oral daily  pantoprazole    Tablet 40 milliGRAM(s) Oral before breakfast  polyethylene glycol 3350 17 Gram(s) Oral once  thiamine 200 milliGRAM(s) Oral daily    MEDICATIONS  (PRN):  acetaminophen   Tablet .. 650 milliGRAM(s) Oral every 4 hours PRN Temp greater or equal to 38.5C (101.3F)  ALBUTerol    90 MICROgram(s) HFA Inhaler 2 Puff(s) Inhalation every 4 hours PRN Shortness of Breath and/or Wheezing  benzonatate 100 milliGRAM(s) Oral three times a day PRN Cough  guaifenesin/dextromethorphan  Syrup 10 milliLiter(s) Oral every 4 hours PRN Cough      LABS:                          12.0   7.63  )-----------( 369      ( 2020 06:07 )             38.7     04-20    146<H>  |  103  |  51.0<H>  ----------------------------<  146<H>  4.3   |  29.0  |  1.76<H>    Ca    8.8      2020 09:27      Urinalysis Basic - ( 2020 14:35 )    Color: Yellow / Appearance: Clear / S.020 / pH: x  Gluc: x / Ketone: Negative  / Bili: Negative / Urobili: Negative mg/dL   Blood: x / Protein: 30 mg/dL / Nitrite: Negative   Leuk Esterase: Negative / RBC: 0-2 /HPF / WBC 0-2   Sq Epi: x / Non Sq Epi: Negative / Bacteria: Negative    RADIOLOGY & ADDITIONAL TESTS:    < from: CT Head No Cont (20 @ 18:42) >     EXAM:  CT BRAIN                          PROCEDURE DATE:  2020          INTERPRETATION:  Head CT without contrast   COMPARISON: 2019.  CLINICAL INFORMATION: .  TECHNIQUE: Contiguous axial 2.5 mm slice thickness images of the head were obtained without the use of intravenous contrast media.  This scan was performed using automatic exposure control (radiation dose reduction software) to obtain a diagnostic image quality scan with patient dose as low as reasonably achievable.     FINDINGS:  Vascular calcification seen within carotid siphon vessels.   Moderate cerebral volume loss is present with secondary proportional prominence of the sulci and ventricles.    The posterior fossa structures and basal cisterns appear normal.    There is no intracranial hemorrhage.     There is no intracranial mass or midline shift.    There is no sulcal effacement to suggest acute stroke.    There are scattered white matter hypodensities consistent with small vessel disease.    The basal gangliaand thalami appear normal in morphology and attenuation.    The visualized portions of the optic globes and paranasal sinuses are normal.    There are no skull fractures.    IMPRESSION:  No interval change  No acute intracranial findings.    Moderateatrophy and chronic white matter microvascular ischemic changes as described.   If acute stroke is of clinical concern, MRI with diffusion-weighted images would be helpful for further characterization.      DORIS NEUMANN M.D., ATTENDING RADIOLOGIST  This document has been electronically signed. 2020  6:56PM        < end of copied text >    < from: 12 Lead ECG (04.19.20 @ 13:51) >    Ventricular Rate 87 BPM    Atrial Rate 87 BPM    P-R Interval 172 ms    QRS Duration 104 ms    Q-T Interval 402 ms    QTC Calculation(Bezet) 483 ms    P Axis 66 degrees    R Axis -44 degrees    T Axis -5 degrees    Diagnosis Line Sinus rhythm with Premature supraventricular complexes and with occasional Premature ventricular complexes and Fusion complexes  Left axis deviation  Low voltage QRS  Incomplete right bundle branch block  Inferior infarct , age undetermined  Long QTc  Abnormal ECG    Confirmed by JAM ANGUIANO ([id number]) on 2020 2:33:07 PM    < end of copied text >          REVIEW OF SYSTEMS:    As above , all other systems reviewed and are negative .     Vital Signs Last 24 Hrs  T(C): 36.7 (2020 08:53), Max: 36.8 (2020 23:42)  T(F): 98 (2020 08:53), Max: 98.3 (2020 23:42)  HR: 81 (2020 08:53) (81 - 81)  BP: 147/80 (2020 08:53) (123/62 - 147/80)  BP(mean): --  RR: 20 (2020 08:53) (20 - 20)  SpO2: 91% (2020 08:53) (91% - 92%)    PHYSICAL EXAM:    GENERAL: NAD, well-groomed, well-developed  HEAD:  Atraumatic, Normocephalic  EYES: EOMI, PERRLA, conjunctiva and sclera clear  NECK: Supple, No JVD, Normal thyroid  NERVOUS SYSTEM:  Alert & Oriented X3, no focal deficit , tremor +   CHEST/LUNG: CTA b/l ,  no  rales, rhonchi, wheezing, or rubs  HEART: Regular rate and rhythm; No murmurs, rubs, or gallops  ABDOMEN: Soft, Nontender, Nondistended; Bowel sounds present  EXTREMITIES:  2+ Peripheral Pulses, No clubbing, cyanosis, or edema  LYMPH: No lymphadenopathy noted  SKIN: No rashes or lesions

## 2020-04-20 NOTE — DISCHARGE NOTE PROVIDER - NSDCCPCAREPLAN_GEN_ALL_CORE_FT
PRINCIPAL DISCHARGE DIAGNOSIS  Diagnosis: COVID-19 virus infection  Assessment and Plan of Treatment: Complete Plaquenil.      SECONDARY DISCHARGE DIAGNOSES  Diagnosis: Parkinsons disease  Assessment and Plan of Treatment: Continue current medications.    Diagnosis: Hypoxia  Assessment and Plan of Treatment: PRINCIPAL DISCHARGE DIAGNOSIS  Diagnosis: COVID-19 virus infection  Assessment and Plan of Treatment: Complete Plaquenil , last dose on 4/21      SECONDARY DISCHARGE DIAGNOSES  Diagnosis: Parkinson's disease  Assessment and Plan of Treatment:     Diagnosis: Acute renal failure  Assessment and Plan of Treatment:     Diagnosis: CHF, acute on chronic  Assessment and Plan of Treatment:     Diagnosis: Parkinsons disease  Assessment and Plan of Treatment: Continue current medications.    Diagnosis: Hypoxia  Assessment and Plan of Treatment: continue O2 and wean off

## 2020-04-20 NOTE — DISCHARGE NOTE PROVIDER - CARE PROVIDER_API CALL
pmd,   Phone: (   )    -  Fax: (   )    -  Follow Up Time: Ismael Hernandez)  Family Medicine  [address] [place] [address]  Phone: [phone]  Fax: [phone]  Follow Up Time:

## 2020-04-20 NOTE — PHYSICAL THERAPY INITIAL EVALUATION ADULT - IMPAIRMENTS FOUND, PT EVAL
gross motor/aerobic capacity/endurance/muscle strength/arousal, attention, and cognition/gait, locomotion, and balance

## 2020-04-20 NOTE — PHYSICAL THERAPY INITIAL EVALUATION ADULT - CRITERIA FOR SKILLED THERAPEUTIC INTERVENTIONS
anticipated equipment needs at discharge/anticipated discharge recommendation/risk reduction/prevention/impairments found/functional limitations in following categories

## 2020-04-20 NOTE — DISCHARGE NOTE PROVIDER - HOSPITAL COURSE
85 year old male with pmh of parkinsons, COPD, htn, rheumatoid arthritis on chronic steroids, chf, gerd coming to hospital after being sent in by  for desaturation. Patient poor historian and history provided by family. Patient was given azithro and tylenol by pmd 1 day prior. Patient was 85% on room air at  and 89% in ED. CT head negative.  CXR showed patchy infiltrates seen in the right midlung on admission. COVID19 positive. Remains on supplemental O2 2LNC.  Patient treated with Plaquenil and IV steroids.  PT evaluated and recommended MAYTE.          Vital Signs Last 24 Hrs    T(C): 36.7 (20 Apr 2020 08:53), Max: 36.8 (19 Apr 2020 23:42)    T(F): 98 (20 Apr 2020 08:53), Max: 98.3 (19 Apr 2020 23:42)    HR: 81 (20 Apr 2020 08:53) (81 - 81)    BP: 147/80 (20 Apr 2020 08:53) (123/62 - 147/80)    BP(mean): --    RR: 20 (20 Apr 2020 08:53) (20 - 20)    SpO2: 91% (20 Apr 2020 08:53) (91% - 92%)        PHYSICAL EXAM:    GENERAL: NAD    HEAD:  Atraumatic, Normocephalic    NECK: Supple, No JVD, Normal thyroid    NERVOUS SYSTEM:  Alert , Good concentration; generalized weakness    CHEST/LUNG: Clear to auscultation bilaterally    HEART: Regular rate and rhythm; No murmurs, rubs, or gallops    ABDOMEN: Soft, Nontender, Nondistended; Bowel sounds present    EXTREMITIES:  2+ Peripheral Pulses, No clubbing, cyanosis, or edema 85 year old male with pmh of parkinsons, COPD, htn, rheumatoid arthritis on chronic steroids, chf, gerd coming to hospital after being sent in by  for desaturation. Patient poor historian and history provided by family. Patient was given azithro and tylenol by pmd 1 day prior. Patient was 85% on room air at  and 89% in ED. CT head negative.  CXR showed patchy infiltrates seen in the right midlung on admission. COVID19 positive ( 4/17/20 ) . Remains on supplemental O2 2LNC. O2 on RA AT REST 92 % , on RA with ambulation 87%.  Patient treated with Plaquenil and IV steroids.  PT evaluated and recommended MAYTE.          Vital Signs Last 24 Hrs    T(C): 36.7 (20 Apr 2020 08:53), Max: 36.8 (19 Apr 2020 23:42)    T(F): 98 (20 Apr 2020 08:53), Max: 98.3 (19 Apr 2020 23:42)    HR: 81 (20 Apr 2020 08:53) (81 - 81)    BP: 147/80 (20 Apr 2020 08:53) (123/62 - 147/80)    BP(mean): --    RR: 20 (20 Apr 2020 08:53) (20 - 20)    SpO2: 91% (20 Apr 2020 08:53) (91% - 92%)        PHYSICAL EXAM:    GENERAL: NAD    HEAD:  Atraumatic, Normocephalic    NECK: Supple, No JVD, Normal thyroid    NERVOUS SYSTEM:  Alert , Good concentration; generalized weakness    CHEST/LUNG: Clear to auscultation bilaterally    HEART: Regular rate and rhythm; No murmurs, rubs, or gallops    ABDOMEN: Soft, Nontender, Nondistended; Bowel sounds present    EXTREMITIES:  2+ Peripheral Pulses, No clubbing, cyanosis, or edema 85 year old male with pmh of parkinsons, COPD, htn, rheumatoid arthritis on chronic steroids, chf, gerd coming to hospital after being sent in by  for desaturation. Patient poor historian and history provided by family. Patient was given azithro and tylenol by pmd 1 day prior. Patient was 85% on room air at  and 89% in ED. CT head negative.  CXR showed patchy infiltrates seen in the right midlung on admission. COVID19 positive ( 4/17/20 ) . Remains on supplemental O2 2LNC. O2 on RA AT REST 92 % , on RA with ambulation 87%.  Patient treated with Plaquenil and IV steroids.  PT evaluated and recommended MAYTE.      Noted with mild acute on chronic CHF , IV Lasix given with improvement but Acute renal failure with Cr of 2 , diuretics on hold ,     today's Cr - 1.7 , will recommend to hold Lasix ( home dose 20 mg daily ) for 2 days and restart once renal function stable .     Patient is full CODE after long discussion with patient and his son . Recommended dvt prophylaxis with Lovenox vs Xarelto 10 mg daily for 39 days post hospitalization due to high risk of microemboli 2/2 to COVID + .         PHYSICAL EXAM:    GENERAL: NAD    HEAD:  Atraumatic, Normocephalic    NECK: Supple, No JVD, Normal thyroid    NERVOUS SYSTEM:  Alert , Good concentration; generalized weakness    CHEST/LUNG: Clear to auscultation bilaterally    HEART: Regular rate and rhythm; No murmurs, rubs, or gallops    ABDOMEN: Soft, Nontender, Nondistended; Bowel sounds present    EXTREMITIES:  2+ Peripheral Pulses, No clubbing, cyanosis, or edema        It has been determined that you no longer need hospitalization and can recover while remaining in self-quarantine at home for 14 days. You should follow the prevention steps below until a healthcare provider or [facility] says you can return to your normal activities.  Please remain in quarantine until then. You should restrict activities outside your home except for getting medical care.  Do not go to work, school or public areas.  Avoid using public transportation.  Separate yourself from other people and animals in your home.  Cover your coughs and sneezes.  Clean your hands often. Avoid sharing personal household items. Clean all high touch surfaces. Monitor your symptoms, if you have a medical emergency call 911.        More than 30 min spent with note , NP , nurse , son .

## 2020-04-20 NOTE — PHYSICAL THERAPY INITIAL EVALUATION ADULT - GAIT DEVIATIONS NOTED, PT EVAL
shuffling feet/decreased kassidy/decreased step length/decreased stride length/decreased weight-shifting ability

## 2020-04-20 NOTE — PHYSICAL THERAPY INITIAL EVALUATION ADULT - TRANSFER SAFETY CONCERNS NOTED: SIT/STAND, REHAB EVAL
decreased sequencing ability/decreased weight-shifting ability/decreased balance during turns/losing balance

## 2020-04-20 NOTE — PHYSICAL THERAPY INITIAL EVALUATION ADULT - ADDITIONAL COMMENTS
pt lives alone in a house with 0 steps to enter and 0 steps inside. pt states that his daughter will be staying with him when he goes home. pt owns a [place].

## 2020-04-20 NOTE — DISCHARGE NOTE PROVIDER - NSDCMRMEDTOKEN_GEN_ALL_CORE_FT
albuterol 90 mcg/inh inhalation aerosol: 2 puff(s) inhaled every 4 hours, As needed, Shortness of Breath and/or Wheezing  ascorbic acid 500 mg oral tablet: 1 tab(s) orally 2 times a day  benzonatate 100 mg oral capsule: 1 cap(s) orally 3 times a day, As needed, Cough  budesonide-formoterol 160 mcg-4.5 mcg/inh inhalation aerosol: 2 puff(s) inhaled 2 times a day  carbidopa-levodopa 25 mg-100 mg oral tablet: 1.5 tab(s) orally 4 times a day  cholecalciferol oral tablet: 1000 unit(s) orally once a day  DULoxetine 30 mg oral delayed release capsule: 1 cap(s) orally once a day  gabapentin 300 mg oral capsule: 1 cap(s) orally 2 times a day  guaifenesin-dextromethorphan 100 mg-10 mg/5 mL oral liquid: 10 milliliter(s) orally every 4 hours, As needed, Cough  hydroxychloroquine 200 mg oral tablet: 2 tab(s) orally once a day on 4/21/20  metoprolol succinate 25 mg oral tablet, extended release: 1 tab(s) orally once a day  pantoprazole 40 mg oral delayed release tablet: 1 tab(s) orally once a day  predniSONE 20 mg oral tablet: 1 tab(s) orally every 12 hours start 4/21/20  thiamine 100 mg oral tablet: 2 tab(s) orally once a day  Tylenol 500 mg oral tablet: 1 tab(s) orally once a day

## 2020-04-20 NOTE — PROGRESS NOTE ADULT - ASSESSMENT
85 year old male with pmh of parkinsons, COPD, htn, rheumatoid arthritis on chronic steroids, chf, gerd coming to hospital after being sent in by  for desaturation , patient poor historian and history provided by family. Symptoms  started 2 days PTA ,  was 85% on room air at  and 89% in ED , having cough and weakness and not sleeping much. Denies SOB. Was recently d/c from [facility]  to Home 1 weak prior coming here .   Tested COVID - 19 positive on 4/17/20 .     1. Acute hypoxic respiratory failure 2/2 viral PNA 2/2 COVID19.   Patchy infiltrates seen in the right midlung on admission. COVID19 positive. Remains on supplemental O2 2LNC this AM. Afebrile overnight.     - Continue isolation precautions  - Continue hydroxychloroquine & steroids  - Continue Vit C/ Vit D/ Thiamine  - Albuterol PRN  - PULSE OX Q8HRS   - Tylenol for fever/pain  - IS Q1H while awake  - Wean O2 as tolerated       2. Encephalopathy  Improved -Likely due to infection/hypoxia PTA. Possible contributing factor underlying Parkinson's. Head CT negative for acute process. Alert and oriented to self, location, & general situation on exam today.    -Encourage sleep hygiene     3. HFpEF   LVEF 65% 12/2019, BNP elevated >2K on admission. Trop normal. Given 40 IV Lasix on admission. Takes Lasix 20mg & metoprolol at home. BNP down this AM.     - Holding further diuresis at this time  - Monitor I&O Q4H   - Daily weight  - Cardiac diet   - Continue metoprolol  - Repeat BMP in AM    4. HTN  Chronic. Normotensive today.    - monitor blood pressure  - metoprolol with hold parameters    5. GERD    -Continue PPI    6. COPD    -Continue Symbicort     7. ASIF on CKD3  Cr elevated at 2 on admission. Baseline 1.22 on 01/2020. Possible contributing factor include COVID infection, renal congestion 2/2 acute CHF and/or chronic diuretic use. Cr better today - 1.7 , will hold diuretic and consider to restart in 1-2 days if renal function stable .   - Avoid nephrotoxic medications   - Renally dose medications as indicated    8. Parkinson's disease  Chronic. With tremor and rigidity on exam. Various small abrasions to extremities. He is high fall risk & with recent SNF stay.     - Continue home Sinemet, Duloxetine & gabapentin   - PT eval & treat - Home vs SNF at d/c  - Fall precautions     DVT prophy  - Lovenox SC     Chronic use of steroids - as per son due to rheumatoid arthritis , will d/c iv steroids and continue home dose in 2 days   patient takes 10 mg BID     Dispo plan MAYTE as patient need 2 person assist .   Spoke to patient's son Mr Lisa Little , aware of plan .

## 2020-04-20 NOTE — PHYSICAL THERAPY INITIAL EVALUATION ADULT - GENERAL OBSERVATIONS, REHAB EVAL
Pt received in bed on room air , SpO2 87%. PATRICIA Fernandez cleared pt to be on 2L NCO2 with PT. pt wearing EZ boots bilat.

## 2020-06-02 PROCEDURE — 83880 ASSAY OF NATRIURETIC PEPTIDE: CPT

## 2020-06-02 PROCEDURE — 80053 COMPREHEN METABOLIC PANEL: CPT

## 2020-06-02 PROCEDURE — 84145 PROCALCITONIN (PCT): CPT

## 2020-06-02 PROCEDURE — 85027 COMPLETE CBC AUTOMATED: CPT

## 2020-06-02 PROCEDURE — 93970 EXTREMITY STUDY: CPT

## 2020-06-02 PROCEDURE — 99285 EMERGENCY DEPT VISIT HI MDM: CPT | Mod: 25

## 2020-06-02 PROCEDURE — 97163 PT EVAL HIGH COMPLEX 45 MIN: CPT

## 2020-06-02 PROCEDURE — 70450 CT HEAD/BRAIN W/O DYE: CPT

## 2020-06-02 PROCEDURE — 96374 THER/PROPH/DIAG INJ IV PUSH: CPT

## 2020-06-02 PROCEDURE — 80048 BASIC METABOLIC PNL TOTAL CA: CPT

## 2020-06-02 PROCEDURE — 81001 URINALYSIS AUTO W/SCOPE: CPT

## 2020-06-02 PROCEDURE — 82803 BLOOD GASES ANY COMBINATION: CPT

## 2020-06-02 PROCEDURE — 84484 ASSAY OF TROPONIN QUANT: CPT

## 2020-06-02 PROCEDURE — 87635 SARS-COV-2 COVID-19 AMP PRB: CPT

## 2020-06-02 PROCEDURE — 71045 X-RAY EXAM CHEST 1 VIEW: CPT

## 2020-06-02 PROCEDURE — 94640 AIRWAY INHALATION TREATMENT: CPT

## 2020-06-02 PROCEDURE — 85379 FIBRIN DEGRADATION QUANT: CPT

## 2020-06-02 PROCEDURE — 83615 LACTATE (LD) (LDH) ENZYME: CPT

## 2020-06-02 PROCEDURE — 82728 ASSAY OF FERRITIN: CPT

## 2020-06-02 PROCEDURE — 36415 COLL VENOUS BLD VENIPUNCTURE: CPT

## 2020-06-02 PROCEDURE — 93005 ELECTROCARDIOGRAM TRACING: CPT

## 2020-06-02 PROCEDURE — 86140 C-REACTIVE PROTEIN: CPT

## 2022-06-02 NOTE — ED ADULT TRIAGE NOTE - BSA (M2)
2 Closure 4 Information: This tab is for additional flaps and grafts above and beyond our usual structured repairs.  Please note if you enter information here it will not currently bill and you will need to add the billing information manually.

## 2023-12-07 NOTE — PATIENT PROFILE ADULT - DO YOU WANT TO COMPLETE THE HCP AND NAME A HEALTH CARE AGENT
Presentation:  Keysha Castro was seen today for audiologic evaluation and was referred by SERVANDO Salomon. Keysha presented with the complaint of hearing loss in both ears.  Further case history can be found on today's ENT note.      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    Speech reception thresholds were obtained at 35 dB HL for the right ear and 40 dB HL for the left ear. These were in good agreement with pure tone averages.     Speech discrimination testing was performed and results were as follows:   44% at 75 dB HL in the right ear and 48% at 80 dB HL in the left.        Recommendations:  These results were discussed with Keysha.  All questions and concerns were addressed.   The following recommendations were made:   She was referred back to SERVANDO Salomon for follow up as directed.      Parish Moe   Clinical Audiologist     no
